# Patient Record
Sex: MALE | Race: WHITE | Employment: OTHER | URBAN - METROPOLITAN AREA
[De-identification: names, ages, dates, MRNs, and addresses within clinical notes are randomized per-mention and may not be internally consistent; named-entity substitution may affect disease eponyms.]

---

## 2017-03-21 RX ORDER — LISINOPRIL AND HYDROCHLOROTHIAZIDE 12.5; 2 MG/1; MG/1
TABLET ORAL
Qty: 45 TAB | Refills: 1 | Status: SHIPPED | COMMUNITY
Start: 2017-03-21 | End: 2017-08-14 | Stop reason: SDUPTHER

## 2017-04-05 ENCOUNTER — OFFICE VISIT (OUTPATIENT)
Dept: INTERNAL MEDICINE CLINIC | Age: 72
End: 2017-04-05

## 2017-04-05 VITALS
BODY MASS INDEX: 30.69 KG/M2 | WEIGHT: 207.2 LBS | RESPIRATION RATE: 20 BRPM | OXYGEN SATURATION: 95 % | HEART RATE: 87 BPM | TEMPERATURE: 98 F | HEIGHT: 69 IN | DIASTOLIC BLOOD PRESSURE: 78 MMHG | SYSTOLIC BLOOD PRESSURE: 130 MMHG

## 2017-04-05 DIAGNOSIS — I25.10 ATHEROSCLEROSIS OF NATIVE CORONARY ARTERY OF NATIVE HEART WITHOUT ANGINA PECTORIS: ICD-10-CM

## 2017-04-05 DIAGNOSIS — Z12.11 SCREEN FOR COLON CANCER: ICD-10-CM

## 2017-04-05 DIAGNOSIS — I10 ESSENTIAL HYPERTENSION, BENIGN: ICD-10-CM

## 2017-04-05 DIAGNOSIS — E78.2 MIXED HYPERLIPIDEMIA: Primary | ICD-10-CM

## 2017-04-05 DIAGNOSIS — C61 MALIGNANT NEOPLASM OF PROSTATE (HCC): ICD-10-CM

## 2017-04-05 NOTE — PROGRESS NOTES
HISTORY OF PRESENT ILLNESS  Esther Jackson is a 66 y.o. male. HPI Olivia is seen today for follow up of hyperlipidemia and other problems. 1. Mixed hyperlipidemia, essential hypertension. Reviewed labs. Numbers are stable. 2. Prostate cancer. Up to date with specialist follow up. Treatment is watchful waiting. 3. Preventive medicine. Physical in six months. He declines colonoscopy due to fear of infection. See My Chart notes. A close friend has had a severe complication and this has made him really nervous about getting the test.  I have encouraged him to schedule a consultation with a gastroenterologist to discuss. Medications reviewed. He is on a supplement that appears benign on my review of its ingredients although I told Olivia there were some things in the preparation with which I was not familiar. MedDATA/gwo     Current Outpatient Prescriptions   Medication Sig    lisinopril-hydroCHLOROthiazide (PRINZIDE, ZESTORETIC) 20-12.5 mg per tablet TAKE 1/2 TABLET EVERY MORNING    atorvastatin (LIPITOR) 40 mg tablet TAKE 1 TABLET NIGHTLY    KRILL OIL PO Take  by mouth.  melatonin 3 mg tablet Take  by mouth.  VIAGRA 100 mg tablet take 1 tablet by mouth if needed    cholecalciferol, vitamin D3, 2,000 unit tab Take  by mouth.  amLODIPine (NORVASC) 5 mg tablet Take 5 mg by mouth daily.  fluticasone (FLONASE) 50 mcg/actuation nasal spray 2 sprays by Both Nostrils route daily.  UBIDECARENONE/VITAMIN E MIXED (COQ10  PO) Take  by mouth.  omega-3 fatty acids-vitamin e (FISH OIL) 1,000 mg Cap Take 1 Cap by mouth every morning.  aspirin 81 mg Tab Take 1 Tab by mouth every morning. No current facility-administered medications for this visit. Review of Systems   Constitutional: Negative for weight loss. Respiratory: Negative. Cardiovascular: Negative for chest pain, palpitations, leg swelling and PND. Gastrointestinal: Negative for blood in stool and melena. Musculoskeletal: Negative for myalgias. Neurological: Negative for focal weakness. Physical Exam   Constitutional: No distress. Neck: Carotid bruit is not present. Cardiovascular: Normal rate and regular rhythm. Exam reveals no gallop and no friction rub. No murmur heard. Pulmonary/Chest: Effort normal and breath sounds normal. No respiratory distress. Musculoskeletal: He exhibits edema. Trace stasis edema bilateral lower extremities. Nursing note and vitals reviewed. ASSESSMENT and PLAN  Manuel Eckert was seen today for follow-up.     Diagnoses and all orders for this visit:    Mixed hyperlipidemia  -     LIPID PANEL  -     METABOLIC PANEL, COMPREHENSIVE  -     CBC WITH AUTOMATED DIFF  -     TSH 3RD GENERATION    Malignant neoplasm of prostate [facility])- See urologist as directed     Atherosclerosis of native coronary artery of native heart without angina pectoris- Continue current regimen of prescription and / or OTC medications     Essential hypertension, benign  -     URINALYSIS W/MICROSCOPIC    Screen for colon cancer  -     REFERRAL TO GASTROENTEROLOGY

## 2017-06-01 RX ORDER — ATORVASTATIN CALCIUM 40 MG/1
TABLET, FILM COATED ORAL
Qty: 90 TAB | Refills: 3 | Status: SHIPPED | COMMUNITY
Start: 2017-06-01 | End: 2017-10-05 | Stop reason: SINTOL

## 2017-06-27 RX ORDER — SILDENAFIL CITRATE 100 MG/1
TABLET, FILM COATED ORAL
Qty: 4 TAB | Refills: 98 | Status: SHIPPED | OUTPATIENT
Start: 2017-06-27 | End: 2018-05-30 | Stop reason: SDUPTHER

## 2017-06-27 NOTE — TELEPHONE ENCOUNTER
Last Appointment with Dr. Lelan Gottron:  4/5/2017  Future Appointments  Date Time Provider Heidy Kyle   10/5/2017 2:00 PM Analy Hansen MD [facility] SCHED     Orders Placed This Encounter    VIAGRA 100 mg tablet     Sig: take 1 tablet by mouth daily if needed     Dispense:  4 Tab     Refill:  98     The above medication refills were approved via verbal order by Dr. Lelan Gottron.

## 2017-08-15 RX ORDER — LISINOPRIL AND HYDROCHLOROTHIAZIDE 12.5; 2 MG/1; MG/1
TABLET ORAL
Qty: 45 TAB | Refills: 3 | Status: SHIPPED | COMMUNITY
Start: 2017-08-15 | End: 2018-10-09 | Stop reason: SDUPTHER

## 2017-09-12 ENCOUNTER — TELEPHONE (OUTPATIENT)
Dept: INTERNAL MEDICINE CLINIC | Age: 72
End: 2017-09-12

## 2017-09-12 NOTE — TELEPHONE ENCOUNTER
Spoke with pt - states has been off his cholesterol med for a month - has noticed leg pain he was experiencing is gone. Wants to know what he needs to do next? Noted in chart pt is scheduled for his fuv on 10/5/17.  Will forward to MD.

## 2017-09-26 ENCOUNTER — HOSPITAL ENCOUNTER (OUTPATIENT)
Dept: LAB | Age: 72
Discharge: HOME | End: 2017-09-26
Payer: MEDICARE

## 2017-09-26 PROCEDURE — 36415 COLL VENOUS BLD VENIPUNCTURE: CPT

## 2017-09-26 PROCEDURE — 85025 COMPLETE CBC W/AUTO DIFF WBC: CPT

## 2017-09-26 PROCEDURE — 84443 ASSAY THYROID STIM HORMONE: CPT

## 2017-09-26 PROCEDURE — 80061 LIPID PANEL: CPT

## 2017-09-26 PROCEDURE — 81001 URINALYSIS AUTO W/SCOPE: CPT

## 2017-09-26 PROCEDURE — 80053 COMPREHEN METABOLIC PANEL: CPT

## 2017-09-27 LAB
ALBUMIN SERPL-MCNC: 4.4 G/DL (ref 3.5–4.8)
ALBUMIN/GLOB SERPL: 1.6 {RATIO} (ref 1.2–2.2)
ALP SERPL-CCNC: 64 IU/L (ref 39–117)
ALT SERPL-CCNC: 21 IU/L (ref 0–44)
APPEARANCE UR: CLEAR
AST SERPL-CCNC: 24 IU/L (ref 0–40)
BACTERIA #/AREA URNS HPF: NORMAL /[HPF]
BASOPHILS # BLD AUTO: 0.1 X10E3/UL (ref 0–0.2)
BASOPHILS NFR BLD AUTO: 1 %
BILIRUB SERPL-MCNC: 0.5 MG/DL (ref 0–1.2)
BILIRUB UR QL STRIP: NEGATIVE
BUN SERPL-MCNC: 18 MG/DL (ref 8–27)
BUN/CREAT SERPL: 16 (ref 10–24)
CALCIUM SERPL-MCNC: 9.6 MG/DL (ref 8.6–10.2)
CASTS URNS QL MICRO: NORMAL /LPF
CHLORIDE SERPL-SCNC: 102 MMOL/L (ref 96–106)
CHOLEST SERPL-MCNC: 229 MG/DL (ref 100–199)
CO2 SERPL-SCNC: 25 MMOL/L (ref 18–29)
COLOR UR: YELLOW
CREAT SERPL-MCNC: 1.12 MG/DL (ref 0.76–1.27)
EOSINOPHIL # BLD AUTO: 0.1 X10E3/UL (ref 0–0.4)
EOSINOPHIL NFR BLD AUTO: 2 %
EPI CELLS #/AREA URNS HPF: NORMAL /HPF
ERYTHROCYTE [DISTWIDTH] IN BLOOD BY AUTOMATED COUNT: 14.1 % (ref 12.3–15.4)
GLOBULIN SER CALC-MCNC: 2.7 G/DL (ref 1.5–4.5)
GLUCOSE SERPL-MCNC: 84 MG/DL (ref 65–99)
GLUCOSE UR QL: NEGATIVE
HCT VFR BLD AUTO: 47.6 % (ref 37.5–51)
HDLC SERPL-MCNC: 36 MG/DL
HGB BLD-MCNC: 16.4 G/DL (ref 12.6–17.7)
HGB UR QL STRIP: NEGATIVE
IMM GRANULOCYTES # BLD: 0 X10E3/UL (ref 0–0.1)
IMM GRANULOCYTES NFR BLD: 0 %
KETONES UR QL STRIP: NEGATIVE
LDLC SERPL CALC-MCNC: 138 MG/DL (ref 0–99)
LEUKOCYTE ESTERASE UR QL STRIP: NEGATIVE
LYMPHOCYTES # BLD AUTO: 2.3 X10E3/UL (ref 0.7–3.1)
LYMPHOCYTES NFR BLD AUTO: 36 %
MCH RBC QN AUTO: 29.7 PG (ref 26.6–33)
MCHC RBC AUTO-ENTMCNC: 34.5 G/DL (ref 31.5–35.7)
MCV RBC AUTO: 86 FL (ref 79–97)
MICRO URNS: NORMAL
MICRO URNS: NORMAL
MONOCYTES # BLD AUTO: 0.6 X10E3/UL (ref 0.1–0.9)
MONOCYTES NFR BLD AUTO: 10 %
MUCOUS THREADS URNS QL MICRO: PRESENT
NEUTROPHILS # BLD AUTO: 3.3 X10E3/UL (ref 1.4–7)
NEUTROPHILS NFR BLD AUTO: 51 %
NITRITE UR QL STRIP: NEGATIVE
PH UR STRIP: 6.5 [PH] (ref 5–7.5)
PLATELET # BLD AUTO: 250 X10E3/UL (ref 150–379)
POTASSIUM SERPL-SCNC: 4.6 MMOL/L (ref 3.5–5.2)
PROT SERPL-MCNC: 7.1 G/DL (ref 6–8.5)
PROT UR QL STRIP: NEGATIVE
RBC # BLD AUTO: 5.53 X10E6/UL (ref 4.14–5.8)
RBC #/AREA URNS HPF: NORMAL /HPF
SODIUM SERPL-SCNC: 141 MMOL/L (ref 134–144)
SP GR UR: 1.03 (ref 1–1.03)
TRIGL SERPL-MCNC: 274 MG/DL (ref 0–149)
TSH SERPL DL<=0.005 MIU/L-ACNC: 2.05 UIU/ML (ref 0.45–4.5)
UROBILINOGEN UR STRIP-MCNC: 0.2 MG/DL (ref 0.2–1)
VLDLC SERPL CALC-MCNC: 55 MG/DL (ref 5–40)
WBC # BLD AUTO: 6.4 X10E3/UL (ref 3.4–10.8)
WBC #/AREA URNS HPF: NORMAL /HPF

## 2017-10-05 ENCOUNTER — OFFICE VISIT (OUTPATIENT)
Dept: INTERNAL MEDICINE CLINIC | Age: 72
End: 2017-10-05

## 2017-10-05 VITALS
HEIGHT: 69 IN | SYSTOLIC BLOOD PRESSURE: 120 MMHG | OXYGEN SATURATION: 96 % | BODY MASS INDEX: 30.66 KG/M2 | WEIGHT: 207 LBS | HEART RATE: 91 BPM | TEMPERATURE: 98.5 F | DIASTOLIC BLOOD PRESSURE: 70 MMHG | RESPIRATION RATE: 18 BRPM

## 2017-10-05 DIAGNOSIS — E78.2 MIXED HYPERLIPIDEMIA: ICD-10-CM

## 2017-10-05 DIAGNOSIS — C61 MALIGNANT NEOPLASM OF PROSTATE (HCC): ICD-10-CM

## 2017-10-05 DIAGNOSIS — Z13.39 SCREENING FOR ALCOHOLISM: ICD-10-CM

## 2017-10-05 DIAGNOSIS — I10 ESSENTIAL HYPERTENSION, BENIGN: ICD-10-CM

## 2017-10-05 DIAGNOSIS — Z00.00 MEDICARE ANNUAL WELLNESS VISIT, SUBSEQUENT: Primary | ICD-10-CM

## 2017-10-05 DIAGNOSIS — Z71.89 ADVANCED CARE PLANNING/COUNSELING DISCUSSION: ICD-10-CM

## 2017-10-05 DIAGNOSIS — I25.10 ATHEROSCLEROSIS OF NATIVE CORONARY ARTERY OF NATIVE HEART WITHOUT ANGINA PECTORIS: ICD-10-CM

## 2017-10-05 DIAGNOSIS — Z13.31 SCREENING FOR DEPRESSION: ICD-10-CM

## 2017-10-05 RX ORDER — ROSUVASTATIN CALCIUM 10 MG/1
10 TABLET, COATED ORAL DAILY
Qty: 90 TAB | Refills: 3 | Status: SHIPPED | COMMUNITY
Start: 2017-10-05 | End: 2018-07-26 | Stop reason: SDUPTHER

## 2017-10-05 NOTE — PATIENT INSTRUCTIONS
Today you had a Medicare Wellness Visit. During this visit, we developed and/or updated your personalized health plan to prevent disease and disability based on your current health and risk factors. Please schedule an appt around this time next year so we can continue to keep you on the right path to living a healthy lifestyle. Schedule of Personalized Health Plan    The best way to stay healthy is to live a healthy lifestyle. A healthy lifestyle includes regular exercise, eating a well-balanced diet, keeping a healthy weight and not smoking. Regular physical exams and screening tests are another important way to take care of yourself. Preventive exams provided by health care providers can find health problems early when treatment works best and can keep you from getting certain diseases or illnesses. Preventive services include exams, lab tests, screenings, shots, monitoring and information to help you take care of your own health. All people over 65 should have a pneumonia shot. Pneumonia shots are usually only needed once in a lifetime unless your doctor decides differently. All people over 65 should have a yearly flu shot. People over 65 are at medium to high risk for Hepatitis B. Three shots are needed for complete protection. Talk with your provider for more details. In addition to your physical exam, some screening tests are recommended:    Diabetes Mellitus screening is recommended every year. Glaucoma is an eye disease caused by high pressure in the eye. An eye exam is recommended every year. Cardiovascular screening tests that check your cholesterol and other blood fat (lipid) levels are recommended every five years. Colorectal Cancer screening tests help to find pre-cancerous polyps (growths in the colon) so they can be removed before they turn into cancer. Tests ordered for screening depend on your personal and family history risk factors.       Here is a list of your current Health Maintenance items with a due date:  Health Maintenance   Topic Date Due    GLAUCOMA SCREENING Q2Y  05/04/2009    INFLUENZA AGE 9 TO ADULT  08/01/2017    MEDICARE YEARLY EXAM  10/06/2018    COLONOSCOPY  04/11/2023    DTaP/Tdap/Td series (2 - Td) 04/23/2026    ZOSTER VACCINE AGE 60>  Completed    Pneumococcal 65+ High/Highest Risk  Completed

## 2017-10-05 NOTE — PROGRESS NOTES
Isma Corrales is a 67 y.o. male and presents for Annual Medicare Wellness Visit. Assessment of cognitive impairment: Alert and oriented x 3. Abuse Screen:    Abuse Screening Questionnaire 10/5/2017   Do you ever feel afraid of your partner? N   Are you in a relationship with someone who physically or mentally threatens you? N   Is it safe for you to go home? Y       Depression Screen:   PHQ over the last two weeks 10/5/2017   Little interest or pleasure in doing things Not at all   Feeling down, depressed or hopeless Not at all   Total Score PHQ 2 0       Fall Risk Assessment:    Fall Risk Assessment, last 12 mths 10/5/2017   Able to walk? Yes   Fall in past 12 months? No   Fall with injury? -   Number of falls in past 12 months -   Fall Risk Score -       Activities of Daily Living:    ADL Assessment 10/5/2017   Feeding yourself No Help Needed   Getting from bed to chair No Help Needed   Getting dressed No Help Needed   Bathing or showering No Help Needed   Walk across the room (includes cane/walker) No Help Needed   Using the telphone No Help Needed   Taking your medications No Help Needed   Preparing meals No Help Needed   Managing money (expenses/bills) No Help Needed   Moderately strenuous housework (laundry) No Help Needed   Shopping for personal items (toiletries/medicines) No Help Needed   Shopping for groceries No Help Needed   Driving No Help Needed   Climbing a flight of stairs No Help Needed   Getting to places beyond walking distances No Help Needed       Health Maintenance:  Daily Low Dose Aspirin: yes  Bone Density: NA  Glaucoma Screening: yes, 3/21/17  Immunizations:    Tetanus: up to date 4/23/16. Influenza: will obtain in November 2017. Shingles:  up to date 4/9/12. Pneumovax:  up to date 10/5/09. Prevnar: up to date 10/15/15. Cancer screening:    Cervical: NA.  Breast: NA.  Colon: patient states he is due in 2018.   Prostate:  managed by Urology    Advance Care Planning:   End of Life Planning: has an advanced directive - a copy HAS NOT been provided. Provided pt with \"Respecting Choices packet of Information\" no  Offered facilitator session with NN no     Medications/Allergies: Reviewed with patient  Prior to Admission medications    Medication Sig Start Date End Date Taking? Authorizing Provider   lisinopril-hydroCHLOROthiazide (PRINZIDE, ZESTORETIC) 20-12.5 mg per tablet TAKE 1/2 TABLET EVERY MORNING 8/15/17  Yes Beny Macdonald MD   KRILL OIL PO Take  by mouth. Yes Historical Provider   melatonin 3 mg tablet Take  by mouth. Yes Historical Provider   cholecalciferol, vitamin D3, 2,000 unit tab Take  by mouth. Yes Historical Provider   amLODIPine (NORVASC) 5 mg tablet Take 5 mg by mouth daily. 9/14/15  Yes Historical Provider   UBIDECARENONE/VITAMIN E MIXED (COQ10  PO) Take  by mouth. Yes Historical Provider   omega-3 fatty acids-vitamin e (FISH OIL) 1,000 mg Cap Take 1 Cap by mouth every morning. Yes Historical Provider   aspirin 81 mg Tab Take 1 Tab by mouth every morning. Yes Historical Provider   VIAGRA 100 mg tablet take 1 tablet by mouth daily if needed 6/27/17   Beny Macdonald MD   atorvastatin (LIPITOR) 40 mg tablet TAKE 1 TABLET NIGHTLY 6/1/17   Beny Macdonald MD   fluticasone (FLONASE) 50 mcg/actuation nasal spray 2 sprays by Both Nostrils route daily. 10/9/14   Beny Macdonald MD     Allergies   Allergen Reactions   Jimbo Hock Pcn [Penicillins] Rash       PSH: Reviewed with patient  Past Surgical History:   Procedure Laterality Date    ENDOSCOPY, COLON, DIAGNOSTIC      08, 14 due 18- see my chart message 5/12/17.         SH: Reviewed with patient  Social History   Substance Use Topics    Smoking status: Never Smoker    Smokeless tobacco: Never Used    Alcohol use 0.0 oz/week     0 Standard drinks or equivalent per week      Comment: weekend drinks social       FH: Reviewed with patient  Family History   Problem Relation Age of Onset    Heart Disease Father     Diabetes Father     Heart Disease Sister     Depression Brother          Objective:  Visit Vitals    /81 (BP 1 Location: Right arm, BP Patient Position: Sitting)    Pulse 91    Temp 98.5 °F (36.9 °C) (Oral)    Resp 18    Ht 5' 9\" (1.753 m)    Wt 207 lb (93.9 kg)    SpO2 96%    BMI 30.57 kg/m2    Body mass index is 30.57 kg/(m^2). Alcohol Risk Screen:   On any occasion during past 3 months, have you had more than 3 drinks (female) or 4 drinks (male) containing alcohol? No  Do you average more than 7 drinks (female) or 14 drinks (male) per week? No  Type and Amount: socially on occasion    Tobacco Abuse:  No    Nutrition Screen:  eats a balanced diet    Hearing Loss:  mild    Vision Loss:   Wears glasses, contact lenses, or have any other visual impairment  Yes, glasses    Activities of Daily Living:  Self-care. Requires assistance with: no ADLs  Patient handle his/her own medications  yes     Current medical providers:    Patient Care Team:  Allyssa Barreto MD as PCP - General      Plan:      No orders of the defined types were placed in this encounter. Health Maintenance   Topic Date Due    INFLUENZA AGE 9 TO ADULT  08/01/2017    COLONOSCOPY  04/11/2018    MEDICARE YEARLY EXAM  10/06/2018    GLAUCOMA SCREENING Q2Y  03/02/2019    DTaP/Tdap/Td series (2 - Td) 04/23/2026    ZOSTER VACCINE AGE 60>  Completed    Pneumococcal 65+ High/Highest Risk  Completed       *Patient verbalized understanding and agreement with the plan. A copy of the After Visit Summary with personalized health plan was given to the patient today. Physical Exam will be performed by PCP and documented under a separate Progress Note.

## 2017-10-05 NOTE — PROGRESS NOTES
HISTORY OF PRESENT ILLNESS  Derrek Mitchell is a 67 y.o. male. DB Baker is seen today for a complete physical examination, Medicare Wellness Visit and follow up of chronic problems. Preventive medicine. See attached nurse navigator note. This was fully reviewed and discussed with the navigator. He is due for bringing in a copy of his Advanced Medical Directive and the flu vaccine. He will get this later on in the season. He has declined a colonoscopy. Chronic medical problems are reviewed. 1. He has significant elevation of cholesterol. He had myalgias with 40 mg of Lipitor. Reviewed with him his diagnosis of CAD based on calcium screening, indicated treatment is required to lessen a chance of heart attack or stroke. He agrees. We will try Crestor at a lower dose and make sure he tolerates it and then adjust as needed. He denies chest pain, shortness of breath, or other cardiac symptom. 2. Prostate cancer. Up to date with urology assessment. 3. Hypertension. Excellent home readings, average of 120/70. MedDATA/gwo     Current Outpatient Prescriptions   Medication Sig    rosuvastatin (CRESTOR) 10 mg tablet Take 1 Tab by mouth daily.  lisinopril-hydroCHLOROthiazide (PRINZIDE, ZESTORETIC) 20-12.5 mg per tablet TAKE 1/2 TABLET EVERY MORNING    KRILL OIL PO Take  by mouth.  melatonin 3 mg tablet Take  by mouth.  cholecalciferol, vitamin D3, 2,000 unit tab Take  by mouth.  amLODIPine (NORVASC) 5 mg tablet Take 5 mg by mouth daily.  UBIDECARENONE/VITAMIN E MIXED (COQ10  PO) Take  by mouth.  omega-3 fatty acids-vitamin e (FISH OIL) 1,000 mg Cap Take 1 Cap by mouth every morning.  aspirin 81 mg Tab Take 1 Tab by mouth every morning.  VIAGRA 100 mg tablet take 1 tablet by mouth daily if needed    fluticasone (FLONASE) 50 mcg/actuation nasal spray 2 sprays by Both Nostrils route daily. No current facility-administered medications for this visit.           Review of Systems   Constitutional: Negative for weight loss. Respiratory: Negative. Cardiovascular: Negative for chest pain, palpitations, leg swelling and PND. Gastrointestinal: Negative. Genitourinary: Positive for frequency. Musculoskeletal: Negative for myalgias. Neurological: Negative for focal weakness. Physical Exam   Constitutional: He is oriented to person, place, and time. He appears well-developed and well-nourished. No distress. HENT:   Head: Normocephalic and atraumatic. Right Ear: Tympanic membrane, external ear and ear canal normal.   Left Ear: Tympanic membrane, external ear and ear canal normal.   Eyes: EOM are normal. Pupils are equal, round, and reactive to light. Right eye exhibits no discharge. Left eye exhibits no discharge. Neck: Normal range of motion. Neck supple. Carotid bruit is not present. No thyromegaly present. Cardiovascular: Normal rate, regular rhythm, normal heart sounds and intact distal pulses. Exam reveals no gallop and no friction rub. No murmur heard. Pulmonary/Chest: Effort normal and breath sounds normal. No respiratory distress. He has no wheezes. He has no rales. Abdominal: Soft. He exhibits no distension and no mass. There is no tenderness. There is no rebound and no guarding. Musculoskeletal: Normal range of motion. He exhibits no edema or tenderness. Lymphadenopathy:     He has no cervical adenopathy. Neurological: He is alert and oriented to person, place, and time. Skin: Skin is warm and dry. No rash noted. Psychiatric: He has a normal mood and affect. His behavior is normal.   Nursing note and vitals reviewed. ASSESSMENT and PLAN  Diagnoses and all orders for this visit:    1. Medicare annual wellness visit, subsequent    2. Advanced care planning/counseling discussion    3. Screening for depression    4. Screening for alcoholism    5. Malignant neoplasm of prostate [facility])- See urologist as directed     6.  Mixed hyperlipidemia  -     rosuvastatin (CRESTOR) 10 mg tablet; Take 1 Tab by mouth daily.  -     COMP METABOLIC PANEL  -     CBC W/ AUTOMATED DIFF  -     LIPID PANEL  -     TSH    7. Essential hypertension, benign- Continue current regimen of prescription and / or OTC medications     8.  Atherosclerosis of native coronary artery of native heart without angina pectoris- Continue current regimen of prescription and / or OTC medications

## 2017-10-05 NOTE — MR AVS SNAPSHOT
Visit Information Date & Time Provider Department Dept. Phone Encounter #  
 10/5/2017  2:00 PM Edgard Julian, [address] [facility] Internal Medicine [phone] [id number] Follow-up Instructions Return in about 6 months (around 4/5/2018). Upcoming Health Maintenance Date Due INFLUENZA AGE 9 TO ADULT 8/1/2017 COLONOSCOPY 4/11/2018 MEDICARE YEARLY EXAM 10/6/2018 GLAUCOMA SCREENING Q2Y 3/2/2019 DTaP/Tdap/Td series (2 - Td) 4/23/2026 Allergies as of 10/5/2017  Review Complete On: 10/5/2017 By: Edgard Julian MD  
  
 Severity Noted Reaction Type Reactions Pcn [Penicillins]  10/02/2009    Rash Current Immunizations  Reviewed on 4/5/2017 Name Date Influenza Vaccine 11/20/2016, 11/8/2014 Pneumococcal Conjugate (PCV-13) 10/15/2015 Tdap 4/23/2016 Varicella Virus Vaccine Live 4/19/2012 ZZZ-RETIRED (DO NOT USE) Pneumococcal Vaccine (Unspecified Type) 10/5/2009 Not reviewed this visit You Were Diagnosed With   
  
 Codes Comments Medicare annual wellness visit, subsequent    -  Primary ICD-10-CM: Z00.00 ICD-9-CM: V70.0 Advanced care planning/counseling discussion     ICD-10-CM: Z71.89 ICD-9-CM: V65.49 Screening for depression     ICD-10-CM: Z13.89 ICD-9-CM: V79.0 Screening for alcoholism     ICD-10-CM: Z13.89 ICD-9-CM: V79.1 Malignant neoplasm of prostate [facility])     ICD-10-CM: E16 ICD-9-CM: 480 Mixed hyperlipidemia     ICD-10-CM: E78.2 ICD-9-CM: 272.2 Essential hypertension, benign     ICD-10-CM: I10 
ICD-9-CM: 401.1 Atherosclerosis of native coronary artery of native heart without angina pectoris     ICD-10-CM: I25.10 ICD-9-CM: 414.01 Vitals BP Pulse Temp Resp Height(growth percentile) Weight(growth percentile) 120/70 91 98.5 °F (36.9 °C) (Oral) 18 5' 9\" (1.753 m) 207 lb (93.9 kg) SpO2 BMI Smoking Status 96% 30.57 kg/m2 Never Smoker Vitals History BMI and BSA Data Body Mass Index Body Surface Area 30.57 kg/m 2 2.14 m 2 Preferred Pharmacy Pharmacy Name Phone Alejandra Webb [address] [place], [place] [address] [facility] [address] [phone] Your Updated Medication List  
  
   
This list is accurate as of: 10/5/17  3:07 PM.  Always use your most recent med list. amLODIPine 5 mg tablet Commonly known as:  Gordon Jonas Take 5 mg by mouth daily. aspirin 81 mg tablet Take 1 Tab by mouth every morning. cholecalciferol (vitamin D3) 2,000 unit Tab Take  by mouth. COQ10  PO Take  by mouth. FISH OIL 1,000 mg Cap Generic drug:  omega-3 fatty acids-vitamin e Take 1 Cap by mouth every morning. fluticasone 50 mcg/actuation nasal spray Commonly known as:  FLONASE  
2 sprays by Both Nostrils route daily. KRILL OIL PO Take  by mouth.  
  
 lisinopril-hydroCHLOROthiazide 20-12.5 mg per tablet Commonly known as:  PRINZIDE, ZESTORETIC  
TAKE 1/2 TABLET EVERY MORNING  
  
 melatonin 3 mg tablet Take  by mouth. rosuvastatin 10 mg tablet Commonly known as:  CRESTOR Take 1 Tab by mouth daily. VIAGRA 100 mg tablet Generic drug:  sildenafil citrate  
take 1 tablet by mouth daily if needed Prescriptions Sent to Mail Order Refills  
 rosuvastatin (CRESTOR) 10 mg tablet 3 Sig: Take 1 Tab by mouth daily. Class: Mail Order Pharmacy: [address] [place] [address] #: [phone] Route: Oral  
  
We Performed the Following CBC WITH AUTOMATED DIFF [33064 CPT(R)] LIPID PANEL [56453 CPT(R)] METABOLIC PANEL, COMPREHENSIVE [09984 CPT(R)] TSH 3RD GENERATION [42519 CPT(R)] Follow-up Instructions Return in about 6 months (around 4/5/2018). Patient Instructions Today you had a Medicare Wellness Visit.   During this visit, we developed and/or updated your personalized health plan to prevent disease and disability based on your current health and risk factors. Please schedule an appt around this time next year so we can continue to keep you on the right path to living a healthy lifestyle. Schedule of Personalized Health Plan The best way to stay healthy is to live a healthy lifestyle. A healthy lifestyle includes regular exercise, eating a well-balanced diet, keeping a healthy weight and not smoking. Regular physical exams and screening tests are another important way to take care of yourself. Preventive exams provided by health care providers can find health problems early when treatment works best and can keep you from getting certain diseases or illnesses. Preventive services include exams, lab tests, screenings, shots, monitoring and information to help you take care of your own health. All people over 65 should have a pneumonia shot. Pneumonia shots are usually only needed once in a lifetime unless your doctor decides differently. All people over 65 should have a yearly flu shot. People over 65 are at medium to high risk for Hepatitis B. Three shots are needed for complete protection. Talk with your provider for more details. In addition to your physical exam, some screening tests are recommended: 
 
Diabetes Mellitus screening is recommended every year. Glaucoma is an eye disease caused by high pressure in the eye. An eye exam is recommended every year. Cardiovascular screening tests that check your cholesterol and other blood fat (lipid) levels are recommended every five years. Colorectal Cancer screening tests help to find pre-cancerous polyps (growths in the colon) so they can be removed before they turn into cancer. Tests ordered for screening depend on your personal and family history risk factors. Here is a list of your current Health Maintenance items with a due date: 
Health Maintenance Topic Date Due  GLAUCOMA SCREENING Q2Y  05/04/2009  INFLUENZA AGE 9 TO ADULT  08/01/2017  MEDICARE YEARLY EXAM  10/06/2018  COLONOSCOPY  04/11/2023  DTaP/Tdap/Td series (2 - Td) 04/23/2026  ZOSTER VACCINE AGE 60>  Completed  Pneumococcal 65+ High/Highest Risk  Completed Introducing [facility] SERVICES! Dear Tequila Bright: Thank you for requesting a [employer] account. Our records indicate that you already have an active [employer] account. You can access your account anytime at [email or web address] Did you know that you can access your hospital and ER discharge instructions at any time in [employer]? You can also review all of your test results from your hospital stay or ER visit. Additional Information If you have questions, please visit the Frequently Asked Questions section of the [employer] website at [email or web address] Remember, [employer] is NOT to be used for urgent needs. For medical emergencies, dial 911. Now available from your iPhone and Android! Please provide this summary of care documentation to your next provider. Your primary care clinician is listed as ANNY RODRIGUEZ. If you have any questions after today's visit, please call [phone].

## 2017-11-15 ENCOUNTER — DOCUMENTATION ONLY (OUTPATIENT)
Dept: INTERNAL MEDICINE CLINIC | Age: 72
End: 2017-11-15

## 2018-01-02 NOTE — MR AVS SNAPSHOT
Left message for patient to return call, wondering if patient could schedule an appointment before running out of medications since it has been almost a year since seeing him.   Visit Information Date & Time Provider Department Dept. Phone Encounter #  
 4/5/2017  9:35 AM Gilberto Nguyễn, [address] [facility] Internal Medicine [phone] [id number] Follow-up Instructions Return in about 6 months (around 10/5/2017) for mwv. Upcoming Health Maintenance Date Due  
 GLAUCOMA SCREENING Q2Y 5/4/2009 Pneumococcal 65+ High/Highest Risk (2 of 2 - PPSV23) 10/5/2014 MEDICARE YEARLY EXAM 10/13/2017 COLONOSCOPY 4/11/2023 DTaP/Tdap/Td series (2 - Td) 4/23/2026 Allergies as of 4/5/2017  Review Complete On: 4/5/2017 By: Gilberto Nguyễn MD  
  
 Severity Noted Reaction Type Reactions Pcn [Penicillins]  10/02/2009    Rash Current Immunizations  Reviewed on 4/5/2017 Name Date Influenza Vaccine 11/20/2016, 11/8/2014 Pneumococcal Conjugate (PCV-13) 10/15/2015 Pneumococcal Vaccine (Unspecified Type) 10/5/2009 Varicella Virus Vaccine Live 4/19/2012 Reviewed by Aviva Denson on 4/5/2017 at  9:46 AM  
 Reviewed by Aviva Denson on 4/5/2017 at  9:46 AM  
You Were Diagnosed With   
  
 Codes Comments Mixed hyperlipidemia    -  Primary ICD-10-CM: J80.3 ICD-9-CM: 272.2 Malignant neoplasm of prostate [facility])     ICD-10-CM: K43 ICD-9-CM: 193 Atherosclerosis of native coronary artery of native heart without angina pectoris     ICD-10-CM: I25.10 ICD-9-CM: 414.01 Essential hypertension, benign     ICD-10-CM: I10 
ICD-9-CM: 401.1 Screen for colon cancer     ICD-10-CM: Z12.11 ICD-9-CM: V76.51 Vitals BP Pulse Temp Resp Height(growth percentile) Weight(growth percentile) 130/78 87 98 °F (36.7 °C) (Oral) 20 5' 9\" (1.753 m) 207 lb 3.2 oz (94 kg) SpO2 BMI Smoking Status 95% 30.6 kg/m2 Never Smoker Vitals History BMI and BSA Data Body Mass Index Body Surface Area 30.6 kg/m 2 2.14 m 2 Preferred Pharmacy Pharmacy Name Phone Alejandra Webb [address] [place], [place] [address] [facility] [address] [phone] Your Updated Medication List  
  
   
This list is accurate as of: 4/5/17 10:43 AM.  Always use your most recent med list. amLODIPine 5 mg tablet Commonly known as:  Rosio Punter Take 5 mg by mouth daily. aspirin 81 mg tablet Take 1 Tab by mouth every morning. atorvastatin 40 mg tablet Commonly known as:  LIPITOR  
TAKE 1 TABLET NIGHTLY  
  
 cholecalciferol (vitamin D3) 2,000 unit Tab Take  by mouth. COQ10  PO Take  by mouth. FISH OIL 1,000 mg Cap Generic drug:  omega-3 fatty acids-vitamin e Take 1 Cap by mouth every morning. fluticasone 50 mcg/actuation nasal spray Commonly known as:  FLONASE  
2 sprays by Both Nostrils route daily. KRILL OIL PO Take  by mouth.  
  
 lisinopril-hydroCHLOROthiazide 20-12.5 mg per tablet Commonly known as:  PRINZIDE, ZESTORETIC  
TAKE 1/2 TABLET EVERY MORNING  
  
 melatonin 3 mg tablet Take  by mouth. VIAGRA 100 mg tablet Generic drug:  sildenafil citrate  
take 1 tablet by mouth if needed We Performed the Following CBC WITH AUTOMATED DIFF [90887 CPT(R)] LIPID PANEL [80954 CPT(R)] METABOLIC PANEL, COMPREHENSIVE [14810 CPT(R)] REFERRAL TO GASTROENTEROLOGY [ZUR84 Custom] TSH 3RD GENERATION [09917 CPT(R)] URINALYSIS W/MICROSCOPIC [43790 CPT(R)] Follow-up Instructions Return in about 6 months (around 10/5/2017) for mwv. Referral Information Referral ID Referred By Referred To  
  
 [id number] JENNIFER, [address] MD Steven   
   [address] [facility] 030 66 62 83 [place], [address] Phone: [phone] Fax: [phone] Visits Status Start Date End Date 1 New Request 4/5/17 4/5/18 If your referral has a status of pending review or denied, additional information will be sent to support the outcome of this decision. Introducing [facility] SERVICES! Dear Patrick Conley: Thank you for requesting a Yottaa account. Our records indicate that you already have an active Yottaa account. You can access your account anytime at [email or web address] Did you know that you can access your hospital and ER discharge instructions at any time in Yottaa? You can also review all of your test results from your hospital stay or ER visit. Additional Information If you have questions, please visit the Frequently Asked Questions section of the Yottaa website at [email or web address] Remember, Yottaa is NOT to be used for urgent needs. For medical emergencies, dial 911. Now available from your iPhone and Android! Please provide this summary of care documentation to your next provider. Your primary care clinician is listed as ANNY RODRIGUEZ. If you have any questions after today's visit, please call [phone].

## 2018-04-06 ENCOUNTER — HOSPITAL ENCOUNTER (OUTPATIENT)
Dept: LAB | Age: 73
Discharge: HOME | End: 2018-04-06
Payer: MEDICARE

## 2018-04-06 PROCEDURE — 80061 LIPID PANEL: CPT

## 2018-04-06 PROCEDURE — 85025 COMPLETE CBC W/AUTO DIFF WBC: CPT

## 2018-04-06 PROCEDURE — 84443 ASSAY THYROID STIM HORMONE: CPT

## 2018-04-06 PROCEDURE — 36415 COLL VENOUS BLD VENIPUNCTURE: CPT

## 2018-04-06 PROCEDURE — 80053 COMPREHEN METABOLIC PANEL: CPT

## 2018-04-09 LAB
ALBUMIN SERPL-MCNC: 4.4 G/DL (ref 3.5–4.8)
ALBUMIN/GLOB SERPL: 1.6 {RATIO} (ref 1.2–2.2)
ALP SERPL-CCNC: 62 IU/L (ref 39–117)
ALT SERPL-CCNC: 16 IU/L (ref 0–44)
AST SERPL-CCNC: 23 IU/L (ref 0–40)
BASOPHILS # BLD AUTO: 0.1 X10E3/UL (ref 0–0.2)
BASOPHILS NFR BLD AUTO: 1 %
BILIRUB SERPL-MCNC: 0.6 MG/DL (ref 0–1.2)
BUN SERPL-MCNC: 16 MG/DL (ref 8–27)
BUN/CREAT SERPL: 15 (ref 10–24)
CALCIUM SERPL-MCNC: 9.8 MG/DL (ref 8.6–10.2)
CHLORIDE SERPL-SCNC: 101 MMOL/L (ref 96–106)
CHOLEST SERPL-MCNC: 165 MG/DL (ref 100–199)
CO2 SERPL-SCNC: 21 MMOL/L (ref 18–29)
CREAT SERPL-MCNC: 1.07 MG/DL (ref 0.76–1.27)
EOSINOPHIL # BLD AUTO: 0.1 X10E3/UL (ref 0–0.4)
EOSINOPHIL NFR BLD AUTO: 2 %
ERYTHROCYTE [DISTWIDTH] IN BLOOD BY AUTOMATED COUNT: 14.5 % (ref 12.3–15.4)
GFR SERPLBLD CREATININE-BSD FMLA CKD-EPI: 68 ML/MIN/1.73
GFR SERPLBLD CREATININE-BSD FMLA CKD-EPI: 79 ML/MIN/1.73
GLOBULIN SER CALC-MCNC: 2.7 G/DL (ref 1.5–4.5)
GLUCOSE SERPL-MCNC: 89 MG/DL (ref 65–99)
HCT VFR BLD AUTO: 46 % (ref 37.5–51)
HDLC SERPL-MCNC: 40 MG/DL
HGB BLD-MCNC: 16.1 G/DL (ref 13–17.7)
IMM GRANULOCYTES # BLD: 0 X10E3/UL (ref 0–0.1)
IMM GRANULOCYTES NFR BLD: 0 %
LDLC SERPL CALC-MCNC: 87 MG/DL (ref 0–99)
LYMPHOCYTES # BLD AUTO: 2.1 X10E3/UL (ref 0.7–3.1)
LYMPHOCYTES NFR BLD AUTO: 31 %
MCH RBC QN AUTO: 30.1 PG (ref 26.6–33)
MCHC RBC AUTO-ENTMCNC: 35 G/DL (ref 31.5–35.7)
MCV RBC AUTO: 86 FL (ref 79–97)
MONOCYTES # BLD AUTO: 0.6 X10E3/UL (ref 0.1–0.9)
MONOCYTES NFR BLD AUTO: 8 %
NEUTROPHILS # BLD AUTO: 4.1 X10E3/UL (ref 1.4–7)
NEUTROPHILS NFR BLD AUTO: 58 %
PLATELET # BLD AUTO: 250 X10E3/UL (ref 150–379)
POTASSIUM SERPL-SCNC: 4 MMOL/L (ref 3.5–5.2)
PROT SERPL-MCNC: 7.1 G/DL (ref 6–8.5)
RBC # BLD AUTO: 5.35 X10E6/UL (ref 4.14–5.8)
SODIUM SERPL-SCNC: 140 MMOL/L (ref 134–144)
TRIGL SERPL-MCNC: 191 MG/DL (ref 0–149)
TSH SERPL DL<=0.005 MIU/L-ACNC: 1.96 UIU/ML (ref 0.45–4.5)
VLDLC SERPL CALC-MCNC: 38 MG/DL (ref 5–40)
WBC # BLD AUTO: 6.9 X10E3/UL (ref 3.4–10.8)

## 2018-04-10 ENCOUNTER — OFFICE VISIT (OUTPATIENT)
Dept: INTERNAL MEDICINE CLINIC | Age: 73
End: 2018-04-10

## 2018-04-10 VITALS
BODY MASS INDEX: 30.66 KG/M2 | WEIGHT: 207 LBS | SYSTOLIC BLOOD PRESSURE: 126 MMHG | RESPIRATION RATE: 18 BRPM | TEMPERATURE: 97.7 F | OXYGEN SATURATION: 95 % | HEIGHT: 69 IN | DIASTOLIC BLOOD PRESSURE: 77 MMHG | HEART RATE: 67 BPM

## 2018-04-10 DIAGNOSIS — I10 ESSENTIAL HYPERTENSION, BENIGN: ICD-10-CM

## 2018-04-10 DIAGNOSIS — E78.2 MIXED HYPERLIPIDEMIA: Primary | ICD-10-CM

## 2018-04-10 DIAGNOSIS — I25.10 ATHEROSCLEROSIS OF NATIVE CORONARY ARTERY OF NATIVE HEART WITHOUT ANGINA PECTORIS: ICD-10-CM

## 2018-04-10 DIAGNOSIS — C61 MALIGNANT NEOPLASM OF PROSTATE (HCC): ICD-10-CM

## 2018-04-10 RX ORDER — TEMAZEPAM 15 MG/1
CAPSULE ORAL
COMMUNITY
End: 2019-12-26 | Stop reason: SDUPTHER

## 2018-04-10 RX ORDER — CETIRIZINE HCL 10 MG
TABLET ORAL
COMMUNITY

## 2018-04-10 NOTE — MR AVS SNAPSHOT
[address] [address] 
[phone] Patient: Sana Sepulveda MRN: [id number] :[birth year] Visit Information Date & Time Provider Department Dept. Phone Encounter #  
 4/10/2018  1:20 PM Lynne Arriaza, 1989 [facility] Internal Medicine [phone] Follow-up Instructions Return in about 6 months (around 10/10/2018) for mwv. Upcoming Health Maintenance Date Due COLONOSCOPY 4/11/2018 MEDICARE YEARLY EXAM 10/6/2018 GLAUCOMA SCREENING Q2Y 3/2/2019 DTaP/Tdap/Td series (2 - Td) 4/23/2026 Allergies as of 4/10/2018  Review Complete On: 4/10/2018 By: Lynne Arriaza MD  
  
 Severity Noted Reaction Type Reactions Pcn [Penicillins]  10/02/2009    Rash Current Immunizations  Reviewed on 4/5/2017 Name Date Influenza Vaccine 11/20/2016, 11/8/2014 Pneumococcal Conjugate (PCV-13) 10/15/2015 Tdap 4/23/2016 Varicella Virus Vaccine Live 4/19/2012 ZZZ-RETIRED (DO NOT USE) Pneumococcal Vaccine (Unspecified Type) 10/5/2009 Not reviewed this visit You Were Diagnosed With   
  
 Codes Comments Mixed hyperlipidemia    -  Primary ICD-10-CM: O28.0 ICD-9-CM: 272.2 Malignant neoplasm of prostate [facility])     ICD-10-CM: H08 ICD-9-CM: 476 Essential hypertension, benign     ICD-10-CM: I10 
ICD-9-CM: 401.1 Atherosclerosis of native coronary artery of native heart without angina pectoris     ICD-10-CM: I25.10 ICD-9-CM: 414.01 Vitals BP Pulse Temp Resp Height(growth percentile) Weight(growth percentile) 126/77 (BP 1 Location: Left arm, BP Patient Position: Sitting) 67 97.7 °F (36.5 °C) (Oral) 18 5' 9\" (1.753 m) 207 lb (93.9 kg) SpO2 BMI Smoking Status 95% 30.57 kg/m2 Never Smoker BMI and BSA Data Body Mass Index Body Surface Area 30.57 kg/m 2 2.14 m 2 Preferred Pharmacy Pharmacy Name Phone [place] [address] [phone] Your Updated Medication List  
  
   
This list is accurate as of 4/10/18  1:50 PM.  Always use your most recent med list. amLODIPine 5 mg tablet Commonly known as:  Leena Pall Take 5 mg by mouth daily. aspirin 81 mg tablet Take 1 Tab by mouth every morning. cholecalciferol (vitamin D3) 2,000 unit Tab Take  by mouth. COQ10  PO Take  by mouth. fluticasone 50 mcg/actuation nasal spray Commonly known as:  FLONASE  
2 sprays by Both Nostrils route daily. KRILL OIL PO Take  by mouth.  
  
 lisinopril-hydroCHLOROthiazide 20-12.5 mg per tablet Commonly known as:  PRINZIDE, ZESTORETIC  
TAKE 1/2 TABLET EVERY MORNING  
  
 melatonin 3 mg tablet Take  by mouth. rosuvastatin 10 mg tablet Commonly known as:  CRESTOR Take 1 Tab by mouth daily. temazepam 15 mg capsule Commonly known as:  RESTORIL Take  by mouth nightly as needed for Sleep. VIAGRA 100 mg tablet Generic drug:  sildenafil citrate  
take 1 tablet by mouth daily if needed ZyrTEC 10 mg tablet Generic drug:  cetirizine Take  by mouth daily as needed for Allergies. We Performed the Following LIPID PANEL [04753 CPT(R)] METABOLIC PANEL, COMPREHENSIVE [54757 CPT(R)] Follow-up Instructions Return in about 6 months (around 10/10/2018) for mwv. Introducing [facility] & HEALTH SERVICES! Dear Luigi Reina: Thank you for requesting a moziy account. Our records indicate that you already have an active moziy account. You can access your account anytime at [email or web address] Did you know that you can access your hospital and ER discharge instructions at any time in moziy? You can also review all of your test results from your hospital stay or ER visit. Additional Information If you have questions, please visit the Frequently Asked Questions section of the PathCentralhart website at [email or web address] Remember, [employer] is NOT to be used for urgent needs. For medical emergencies, dial 911. Now available from your iPhone and Android! Please provide this summary of care documentation to your next provider. Your primary care clinician is listed as ANNY RODRIGUEZ. If you have any questions after today's visit, please call [phone].

## 2018-04-10 NOTE — PROGRESS NOTES
HISTORY OF PRESENT ILLNESS  Julien Ashford is a 67 y.o. male. HPI Olivia is seen today for follow up of hypertension and other problems. 1.  Hypertension. Stable on current regimen. 2.  Hyperlipidemia, reviewed labs. Results are fine. No side effects reported. 3.  CAD per positive calcium score. He has not had symptoms. He is up to date with cardiology follow up. 4.  Prostate cancer. Followed with surveillance. He is up to date with urology. 5.  Preventive medicine. A colonoscopy is pending for later this spring. A Medicare Wellness Visit in six months. Review of  Systems:   Notable for insomnia which requires prn treatment. His cardiologist gave him a mild sedative type sleep medication. Current Outpatient Prescriptions   Medication Sig    temazepam (RESTORIL) 15 mg capsule Take  by mouth nightly as needed for Sleep.  cetirizine (ZYRTEC) 10 mg tablet Take  by mouth daily as needed for Allergies.  rosuvastatin (CRESTOR) 10 mg tablet Take 1 Tab by mouth daily.  lisinopril-hydroCHLOROthiazide (PRINZIDE, ZESTORETIC) 20-12.5 mg per tablet TAKE 1/2 TABLET EVERY MORNING    VIAGRA 100 mg tablet take 1 tablet by mouth daily if needed    KRILL OIL PO Take  by mouth.  melatonin 3 mg tablet Take  by mouth.  cholecalciferol, vitamin D3, 2,000 unit tab Take  by mouth.  amLODIPine (NORVASC) 5 mg tablet Take 5 mg by mouth daily.  fluticasone (FLONASE) 50 mcg/actuation nasal spray 2 sprays by Both Nostrils route daily.  UBIDECARENONE/VITAMIN E MIXED (COQ10  PO) Take  by mouth.  aspirin 81 mg Tab Take 1 Tab by mouth every morning. No current facility-administered medications for this visit. Review of Systems   Constitutional: Negative for weight loss. Respiratory: Negative. Cardiovascular: Negative for chest pain, palpitations, leg swelling and PND. Musculoskeletal: Negative for myalgias. Neurological: Negative for focal weakness.    Psychiatric/Behavioral: The patient has insomnia. Physical Exam   Constitutional: No distress. Neck: Carotid bruit is not present. Cardiovascular: Normal rate and regular rhythm. Exam reveals no gallop and no friction rub. No murmur heard. Pulmonary/Chest: Effort normal and breath sounds normal. No respiratory distress. Musculoskeletal: He exhibits no edema. Nursing note and vitals reviewed. ASSESSMENT and PLAN  Diagnoses and all orders for this visit:    1. Mixed hyperlipidemia  -     LIPID PANEL    2. Malignant neoplasm of prostate [facility])- See urologist as directed     3. Essential hypertension, benign  -     METABOLIC PANEL, COMPREHENSIVE    4. Atherosclerosis of native coronary artery of native heart without angina pectoris- See cardiologist as directed.  Continue current regimen of prescription and / or OTC medications

## 2018-04-17 ENCOUNTER — DOCUMENTATION ONLY (OUTPATIENT)
Dept: FAMILY MEDICINE CLINIC | Age: 73
End: 2018-04-17

## 2018-04-23 ENCOUNTER — DOCUMENTATION ONLY (OUTPATIENT)
Dept: FAMILY MEDICINE CLINIC | Age: 73
End: 2018-04-23

## 2018-05-25 ENCOUNTER — ANESTHESIA EVENT (OUTPATIENT)
Dept: ENDOSCOPY | Age: 73
End: 2018-05-25
Payer: MEDICARE

## 2018-05-25 ENCOUNTER — HOSPITAL ENCOUNTER (OUTPATIENT)
Age: 73
Setting detail: OUTPATIENT SURGERY
Discharge: HOME | End: 2018-05-25
Attending: INTERNAL MEDICINE | Admitting: INTERNAL MEDICINE
Payer: MEDICARE

## 2018-05-25 ENCOUNTER — ANESTHESIA (OUTPATIENT)
Dept: ENDOSCOPY | Age: 73
End: 2018-05-25
Payer: MEDICARE

## 2018-05-25 VITALS
TEMPERATURE: 97.7 F | DIASTOLIC BLOOD PRESSURE: 71 MMHG | OXYGEN SATURATION: 96 % | RESPIRATION RATE: 23 BRPM | HEIGHT: 71 IN | SYSTOLIC BLOOD PRESSURE: 134 MMHG | WEIGHT: 200 LBS | HEART RATE: 69 BPM | BODY MASS INDEX: 28 KG/M2

## 2018-05-25 PROCEDURE — 74011000250 HC RX REV CODE- 250

## 2018-05-25 PROCEDURE — 77030027957 HC TBNG IRR ENDOGTR BUSS -B: Performed by: INTERNAL MEDICINE

## 2018-05-25 PROCEDURE — 74011250636 HC RX REV CODE- 250/636

## 2018-05-25 PROCEDURE — 76060000031 HC ANESTHESIA FIRST 0.5 HR: Performed by: INTERNAL MEDICINE

## 2018-05-25 PROCEDURE — 74011250636 HC RX REV CODE- 250/636: Performed by: INTERNAL MEDICINE

## 2018-05-25 PROCEDURE — 76040000019: Performed by: INTERNAL MEDICINE

## 2018-05-25 RX ORDER — ATROPINE SULFATE 0.1 MG/ML
0.5 INJECTION INTRAVENOUS
Status: DISCONTINUED | OUTPATIENT
Start: 2018-05-25 | End: 2018-05-25 | Stop reason: HOSPADM

## 2018-05-25 RX ORDER — FENTANYL CITRATE 50 UG/ML
100 INJECTION, SOLUTION INTRAMUSCULAR; INTRAVENOUS
Status: DISCONTINUED | OUTPATIENT
Start: 2018-05-25 | End: 2018-05-25 | Stop reason: HOSPADM

## 2018-05-25 RX ORDER — EPINEPHRINE 0.1 MG/ML
1 INJECTION INTRACARDIAC; INTRAVENOUS
Status: DISCONTINUED | OUTPATIENT
Start: 2018-05-25 | End: 2018-05-25 | Stop reason: HOSPADM

## 2018-05-25 RX ORDER — PROPOFOL 10 MG/ML
INJECTION, EMULSION INTRAVENOUS AS NEEDED
Status: DISCONTINUED | OUTPATIENT
Start: 2018-05-25 | End: 2018-05-25 | Stop reason: HOSPADM

## 2018-05-25 RX ORDER — DEXTROMETHORPHAN/PSEUDOEPHED 2.5-7.5/.8
1.2 DROPS ORAL
Status: DISCONTINUED | OUTPATIENT
Start: 2018-05-25 | End: 2018-05-25 | Stop reason: HOSPADM

## 2018-05-25 RX ORDER — SODIUM CHLORIDE 9 MG/ML
50 INJECTION, SOLUTION INTRAVENOUS CONTINUOUS
Status: DISCONTINUED | OUTPATIENT
Start: 2018-05-25 | End: 2018-05-25 | Stop reason: HOSPADM

## 2018-05-25 RX ORDER — NALOXONE HYDROCHLORIDE 0.4 MG/ML
0.4 INJECTION, SOLUTION INTRAMUSCULAR; INTRAVENOUS; SUBCUTANEOUS
Status: DISCONTINUED | OUTPATIENT
Start: 2018-05-25 | End: 2018-05-25 | Stop reason: HOSPADM

## 2018-05-25 RX ORDER — SODIUM CHLORIDE 9 MG/ML
INJECTION, SOLUTION INTRAVENOUS
Status: DISCONTINUED | OUTPATIENT
Start: 2018-05-25 | End: 2018-05-25 | Stop reason: HOSPADM

## 2018-05-25 RX ORDER — LIDOCAINE HYDROCHLORIDE 20 MG/ML
INJECTION, SOLUTION EPIDURAL; INFILTRATION; INTRACAUDAL; PERINEURAL AS NEEDED
Status: DISCONTINUED | OUTPATIENT
Start: 2018-05-25 | End: 2018-05-25 | Stop reason: HOSPADM

## 2018-05-25 RX ORDER — SODIUM CHLORIDE 0.9 % (FLUSH) 0.9 %
5-10 SYRINGE (ML) INJECTION EVERY 8 HOURS
Status: DISCONTINUED | OUTPATIENT
Start: 2018-05-25 | End: 2018-05-25 | Stop reason: HOSPADM

## 2018-05-25 RX ORDER — MIDAZOLAM HYDROCHLORIDE 1 MG/ML
.25-1 INJECTION, SOLUTION INTRAMUSCULAR; INTRAVENOUS
Status: DISCONTINUED | OUTPATIENT
Start: 2018-05-25 | End: 2018-05-25 | Stop reason: HOSPADM

## 2018-05-25 RX ORDER — CIPROFLOXACIN 500 MG/1
500 TABLET ORAL 2 TIMES DAILY
COMMUNITY
End: 2018-10-29 | Stop reason: ALTCHOICE

## 2018-05-25 RX ORDER — SODIUM CHLORIDE 0.9 % (FLUSH) 0.9 %
5-10 SYRINGE (ML) INJECTION AS NEEDED
Status: DISCONTINUED | OUTPATIENT
Start: 2018-05-25 | End: 2018-05-25 | Stop reason: HOSPADM

## 2018-05-25 RX ORDER — FLUMAZENIL 0.1 MG/ML
0.2 INJECTION INTRAVENOUS
Status: DISCONTINUED | OUTPATIENT
Start: 2018-05-25 | End: 2018-05-25 | Stop reason: HOSPADM

## 2018-05-25 RX ADMIN — PROPOFOL 50 MG: 10 INJECTION, EMULSION INTRAVENOUS at 08:37

## 2018-05-25 RX ADMIN — PROPOFOL 50 MG: 10 INJECTION, EMULSION INTRAVENOUS at 08:26

## 2018-05-25 RX ADMIN — PROPOFOL 50 MG: 10 INJECTION, EMULSION INTRAVENOUS at 08:28

## 2018-05-25 RX ADMIN — PROPOFOL 50 MG: 10 INJECTION, EMULSION INTRAVENOUS at 08:31

## 2018-05-25 RX ADMIN — SODIUM CHLORIDE: 9 INJECTION, SOLUTION INTRAVENOUS at 08:15

## 2018-05-25 RX ADMIN — PROPOFOL 50 MG: 10 INJECTION, EMULSION INTRAVENOUS at 08:34

## 2018-05-25 RX ADMIN — LIDOCAINE HYDROCHLORIDE 40 MG: 20 INJECTION, SOLUTION EPIDURAL; INFILTRATION; INTRACAUDAL; PERINEURAL at 08:26

## 2018-05-25 NOTE — PROGRESS NOTES

## 2018-05-25 NOTE — PROCEDURES
[address] [facility].  [address] [place] [address] E Jessy Chase, [address]  [phone]                              Colonoscopy Procedure Note      Indications:    Personal history of colon polyps (screening only)     :  Mary Riley MD    Referring Provider: Vik Coley MD    Sedation:  MAC anesthesia    Procedure Details:  After informed consent was obtained with all risks and benefits of procedure explained and preoperative exam completed, the patient was taken to the endoscopy suite and placed in the left lateral decubitus position. Upon sequential sedation as per above, a digital rectal exam was performed  And was normal.  The Olympus videocolonoscope  was inserted in the rectum and carefully advanced to the cecum, which was identified by the ileocecal valve and appendiceal orifice. The quality of preparation was inadequate. The colonoscope was slowly withdrawn with careful evaluation between folds. Retroflexion in the rectum was performed and was normal..     Findings:   Rectum: no mucosal lesion appreciated  Small internal hemorrhoids were seen  Sigmoid: no mucosal lesion appreciated  Descending Colon: no mucosal lesion appreciated  Transverse Colon: no mucosal lesion appreciated  Ascending Colon: no mucosal lesion appreciated  Cecum: no mucosal lesion appreciated  Terminal Ileum: not intubated    Interventions:  none    Specimen Removed:  * No specimens in log *    Complications: None. EBL:  None. Recommendations:   -High fiber diet. -Repeat colonoscopy in 1 year with double prep    -Resume normal medication(s). Discharge Disposition:  Home in the company of a  when able to ambulate.     Mary Riley MD  5/25/2018  8:43 AM

## 2018-05-25 NOTE — IP AVS SNAPSHOT
[address] 
[phone] Patient: Cornel Hernandez MRN: [id number] YYE:[birth year] About your hospitalization You were admitted on:  May 25, 2018 You last received care in the:  [facility] ENDOSCOPY You were discharged on:  May 25, 2018 Why you were hospitalized Your primary diagnosis was:  Not on File Follow-up Information None Discharge Orders None A check arina indicates which time of day the medication should be taken. My Medications CONTINUE taking these medications Instructions Each Dose to Equal  
 Morning Noon Evening Bedtime  
 amLODIPine 5 mg tablet Commonly known as:  Jaswinder Lute Your last dose was: Your next dose is: Take 5 mg by mouth daily. 5 mg  
    
   
   
   
  
 aspirin 81 mg tablet Your last dose was: Your next dose is: Take 1 Tab by mouth every morning. 1 Tab  
    
   
   
   
  
 cholecalciferol (vitamin D3) 2,000 unit Tab Your last dose was: Your next dose is: Take  by mouth. CIPRO 500 mg tablet Generic drug:  ciprofloxacin HCl Your last dose was: Your next dose is: Take 500 mg by mouth two (2) times a day. Ordered for 21 days for bladder infection. 500 mg  
    
   
   
   
  
 COQ10  PO Your last dose was: Your next dose is: Take  by mouth. fluticasone 50 mcg/actuation nasal spray Commonly known as:  Terri Harper Your last dose was: Your next dose is:    
   
   
 2 sprays by Both Nostrils route daily. 2 Kinder KRILL OIL PO Your last dose was: Your next dose is: Take  by mouth.  
     
   
   
   
  
 lisinopril-hydroCHLOROthiazide 20-12.5 mg per tablet Commonly known as:  Cesia Kern  
   
 Your last dose was: Your next dose is: TAKE 1/2 TABLET EVERY MORNING  
     
   
   
   
  
 melatonin 3 mg tablet Your last dose was: Your next dose is: Take  by mouth. rosuvastatin 10 mg tablet Commonly known as:  CRESTOR Your last dose was: Your next dose is: Take 1 Tab by mouth daily. 10 mg  
    
   
   
   
  
 temazepam 15 mg capsule Commonly known as:  RESTORIL Your last dose was: Your next dose is: Take  by mouth nightly as needed for Sleep. VIAGRA 100 mg tablet Generic drug:  sildenafil citrate Your last dose was: Your next dose is:    
   
   
 take 1 tablet by mouth daily if needed ZyrTEC 10 mg tablet Generic drug:  cetirizine Your last dose was: Your next dose is: Take  by mouth daily as needed for Allergies. Discharge Instructions [facility]. 
[address]  
[phone] Judah Alonso 
[id number] 1945 COLON DISCHARGE INSTRUCTIONS DISCOMFORT: 
Redness at IV site- apply warm compress to area; if redness or soreness persist- contact your physician There may be a slight amount of blood passed from the rectum Gaseous discomfort- walking, belching will help relieve any discomfort You may not operate a vehicle for 12 hours You may not  engage in an occupation involving machinery or appliances for rest of today You may not  drink alcoholic beverages for at least 12 hours Avoid making any critical decisions for at least 24 hour DIET: 
 High fiber diet.  however -  remember your colon is empty and a heavy meal will produce gas. Avoid these foods:  vegetables, fried / greasy foods, carbonated drinks for today ACTIVITY: 
 It is recommended that you spend the remainder of the day resting -  avoid any strenuous activity. CALL M.D. ANY SIGN OF: Increasing pain, nausea, vomiting Abdominal distension (swelling) New increased bleeding (oral or rectal) Fever (chills) Pain in chest area Bloody discharge from nose or mouth Shortness of breath Post procedure diagnosis:  1.- Inadequate Prep, repeat in 1 year with double prep Follow-up Instructions: 
 
 
 
Call Dr. Sukhwinder Ramos for any questions or problems. If we took a biopsy please call the office within 2 weeks to discuss your                             pathology results. Telephone # [phone] ACO Transitions of Care Introducing [employer] 50 Shanelle Luis offers a voluntary care coordination program to provide high quality service and care to [facility] fee-for-service beneficiaries. Mookie Bowie was designed to help you enhance your health and well-being through the following services: ? Transitions of Care  support for individuals who are transitioning from one care setting to another (example: Hospital to home). ? Chronic and Complex Care Coordination  support for individuals and caregivers of those with serious or chronic illnesses or with more than one chronic (ongoing) condition and those who take a number of different medications. If you meet specific medical criteria, a [facility] [address] may call you directly to coordinate your care with your primary care physician and your other care providers. For questions about the [facility] programs, please, contact your physicians office. For general questions or additional information about Accountable Care Organizations: 
Please visit www.medicare.gov/acos. html or call 1-800-MEDICARE (0-980.705.2877) TTY users should call 6-622.424.5582. Introducing [facility] & HEALTH SERVICES! Reece Bunch Pietro: Thank you for requesting a RedSeal Networks account. Our records indicate that you already have an active RedSeal Networks account. You can access your account anytime at [email or web address] Did you know that you can access your hospital and ER discharge instructions at any time in RedSeal Networks? You can also review all of your test results from your hospital stay or ER visit. Additional Information If you have questions, please visit the Frequently Asked Questions section of the RedSeal Networks website at [email or web address] Remember, RedSeal Networks is NOT to be used for urgent needs. For medical emergencies, dial 911. Now available from your iPhone and Android! Introducing Francis Nazario As a [facility] patient, I wanted to make you aware of our electronic visit tool called Francis Nazario. RiversKarmarama allows you to connect within minutes with a medical provider 24 hours a day, seven days a week via a mobile device or tablet or logging into a secure website from your computer. You can access Francis Nazario from anywhere in the [place]. A virtual visit might be right for you when you have a simple condition and feel like you just dont want to get out of bed, or cant get away from work for an appointment, when your regular [facility] provider is not available (evenings, weekends or holidays), or when youre out of town and need minor care. Electronic visits cost only $49 and if the RiverscottonTracks/Cree provider determines a prescription is needed to treat your condition, one can be electronically transmitted to a nearby pharmacy*. Please take a moment to enroll today if you have not already done so. The enrollment process is free and takes just a few minutes. To enroll, please download the TalkBox Limited/Cree wyatt to your tablet or phone, or visit [email or web address] to enroll on your computer. And, as an [address] [place] [address] patient with a Storehouse account, the results of your visits will be scanned into your electronic medical record and your primary care provider will be able to view the scanned results. We urge you to continue to see your regular [facility] provider for your ongoing medical care. And while your primary care provider may not be the one available when you seek a Francis Rubyfin virtual visit, the peace of mind you get from getting a real diagnosis real time can be priceless. For more information on Francis First30Daysfabiolafin, view our Frequently Asked Questions (FAQs) at [email or web address]. Sincerely, 
 
Erick Boyle MD 
Chief Medical Officer [employer] *:  certain medications cannot be prescribed via [employer] Providers Seen During Your Hospitalization Provider Specialty Primary office phone Kelly Fang MD Gastroenterology [phone] Your Primary Care Physician (PCP) Primary Care Physician Office Phone Office Fax Ammon Sherrie [phone] You are allergic to the following Allergen Reactions Pcn (Penicillins) Rash Recent Documentation Height Weight BMI Smoking Status 1.803 m 90.7 kg 27.89 kg/m2 Never Smoker Emergency Contacts Name Discharge Info Relation Home Work Mobile [address] CAREGIVER [3] Other Relative [6] [phone] [place]  Child [2] [phone] Mandi Ferris  Other Relative [6] [phone] Lanette Rob  Spouse [3] [phone] Patient Belongings The following personal items are in your possession at time of discharge: 
  Dental Appliances: None  Visual Aid: Glasses, With patient Please provide this summary of care documentation to your next provider.  
  
  
 
  
Signatures-by signing, you are acknowledging that this After Visit Summary has been reviewed with you and you have received a copy. Patient Signature:  ____________________________________________________________ Date:  ____________________________________________________________  
  
Connye Brod Provider Signature:  ____________________________________________________________ Date:  ____________________________________________________________

## 2018-05-25 NOTE — DISCHARGE INSTRUCTIONS
[address]  [address]  [phone]  1945    COLON DISCHARGE INSTRUCTIONS    DISCOMFORT:  Redness at IV site- apply warm compress to area; if redness or soreness persist- contact your physician  There may be a slight amount of blood passed from the rectum  Gaseous discomfort- walking, belching will help relieve any discomfort  You may not operate a vehicle for 12 hours  You may not  engage in an occupation involving machinery or appliances for rest of today  You may not  drink alcoholic beverages for at least 12 hours  Avoid making any critical decisions for at least 24 hour    DIET:   High fiber diet. - however -  remember your colon is empty and a heavy meal will produce gas. Avoid these foods:  vegetables, fried / greasy foods, carbonated drinks for today     ACTIVITY:  It is recommended that you spend the remainder of the day resting -  avoid any strenuous activity. CALL M.D. ANY SIGN OF:   Increasing pain, nausea, vomiting  Abdominal distension (swelling)  New increased bleeding (oral or rectal)  Fever (chills)  Pain in chest area  Bloody discharge from nose or mouth  Shortness of breath    Post procedure diagnosis:  1.- Inadequate Prep, repeat in 1 year with double prep    Follow-up Instructions:        Call Dr. Gilda Chirinos for any questions or problems. If we took a biopsy please call the office within 2 weeks to discuss your                             pathology results.  Telephone # [phone]

## 2018-05-25 NOTE — ROUTINE PROCESS
Nano Xiao  1945  [id number]    Situation:  Verbal report received from: Vianney Campbell  Procedure: Procedure(s):  COLONOSCOPY    Background:    Preoperative diagnosis: PERSONAL HISTORY OF COLONIC POLYPS   Postoperative diagnosis: 1.- Inadequate Prep    :  Dr. Princess Crouch  Assistant(s): Endoscopy Technician-1: Blade Rosa  Endoscopy RN-1: Genesis Virgen RN    Specimens: * No specimens in log *  H. Pylori  no    Assessment:  Intra-procedure medications     Anesthesia gave intra-procedure sedation and medications, see anesthesia flow sheet yes    Intravenous fluids: NS@ KVO     Vital signs stable yes    Abdominal assessment: round and soft  Yes     Recommendation:  Discharge patient per MD order yes .   Return to floor na   Family or Friend fam  Permission to share finding with family or friend yes

## 2018-05-25 NOTE — ANESTHESIA POSTPROCEDURE EVALUATION
Post-Anesthesia Evaluation and Assessment    Patient: Justen Garcia MRN: [id number]  SSN: [id number]    YOB: 1945  Age: 75 y.o. Sex: male       Cardiovascular Function/Vital Signs  Visit Vitals    /71    Pulse 69    Temp 36.5 °C (97.7 °F)    Resp 23    Ht 5' 11\" (1.803 m)    Wt 90.7 kg (200 lb)    SpO2 96%    BMI 27.89 kg/m2       Patient is status post MAC anesthesia for Procedure(s):  COLONOSCOPY. Nausea/Vomiting: None    Postoperative hydration reviewed and adequate. Pain:  Pain Scale 1: Numeric (0 - 10) (05/25/18 0910)  Pain Intensity 1: 0 (05/25/18 0910)   Managed    Neurological Status: At baseline    Mental Status and Level of Consciousness: Arousable    Pulmonary Status:   O2 Device: Room air (05/25/18 0910)   Adequate oxygenation and airway patent    Complications related to anesthesia: None    Post-anesthesia assessment completed.  No concerns    Signed By: Neel Oshea MD     May 25, 2018

## 2018-05-25 NOTE — ANESTHESIA PREPROCEDURE EVALUATION
Anesthetic History   No history of anesthetic complications            Review of Systems / Medical History  Patient summary reviewed, nursing notes reviewed and pertinent labs reviewed    Pulmonary  Within defined limits                 Neuro/Psych   Within defined limits           Cardiovascular    Hypertension: well controlled          CAD    Exercise tolerance: >4 METS  Comments: EF 50%   GI/Hepatic/Renal               Comments: Hx polyps Endo/Other             Other Findings            Physical Exam    Airway  Mallampati: I  TM Distance: > 6 cm  Neck ROM: normal range of motion   Mouth opening: Normal     Cardiovascular    Rhythm: regular  Rate: normal         Dental  No notable dental hx       Pulmonary  Breath sounds clear to auscultation               Abdominal         Other Findings            Anesthetic Plan    ASA: 2  Anesthesia type: MAC          Induction: Intravenous  Anesthetic plan and risks discussed with: Patient

## 2018-05-25 NOTE — H&P
[address]  [address] [place], [address]  [phone]                                History and Physical     NAME: Kym Griggs   :  [birth year]   MRN:  [id number]     HPI:  The patient was seen and examined. Date of last colonoscopy: 5 years, Polyps  Yes     Past Surgical History:   Procedure Laterality Date    ENDOSCOPY, COLON, DIAGNOSTIC       due 18- see my chart message 17. Past Medical History:   Diagnosis Date    Colon polyps     ED (erectile dysfunction)     Hypercholesterolemia     Hypertension      Social History   Substance Use Topics    Smoking status: Never Smoker    Smokeless tobacco: Never Used    Alcohol use 0.0 oz/week     0 Standard drinks or equivalent per week      Comment: weekend drinks social     Allergies   Allergen Reactions    Pcn [Penicillins] Rash     Family History   Problem Relation Age of Onset    Heart Disease Father     Diabetes Father     Heart Disease Sister     Depression Brother      Current Facility-Administered Medications   Medication Dose Route Frequency    0.9% sodium chloride infusion  50 mL/hr IntraVENous CONTINUOUS    sodium chloride (NS) flush 5-10 mL  5-10 mL IntraVENous Q8H    sodium chloride (NS) flush 5-10 mL  5-10 mL IntraVENous PRN    midazolam (VERSED) injection 0.25-10 mg  0.25-10 mg IntraVENous Multiple    fentaNYL citrate (PF) injection 100 mcg  100 mcg IntraVENous MULTIPLE DOSE GIVEN    naloxone (NARCAN) injection 0.4 mg  0.4 mg IntraVENous Multiple    flumazenil (ROMAZICON) 0.1 mg/mL injection 0.2 mg  0.2 mg IntraVENous Multiple    simethicone (MYLICON) 55LJ/4.1WB oral drops 80 mg  1.2 mL Oral Multiple    atropine injection 0.5 mg  0.5 mg IntraVENous ONCE PRN    EPINEPHrine (ADRENALIN) 0.1 mg/mL syringe 1 mg  1 mg Endoscopically ONCE PRN         PHYSICAL EXAM:  General: WD, WN. Alert, cooperative, no acute distress    HEENT: NC, Atraumatic. PERRLA, EOMI.  Anicteric sclerae. Lungs:  CTA Bilaterally. No Wheezing/Rhonchi/Rales. Heart:  Regular  rhythm,  No murmur, No Rubs, No Gallops  Abdomen: Soft, Non distended, Non tender.  +Bowel sounds, no HSM  Extremities: No c/c/e  Neurologic:  CN 2-12 gi, Alert and oriented X 3. No acute neurological distress   Psych:   Good insight. Not anxious nor agitated. The heart, lungs and mental status were satisfactory for the administration of MAC sedation and for the procedure.       Mallampati score: 3       Assessment:   · Personal history colon polyps    Plan:   · Endoscopic procedure  · MAC sedation   ·   ·

## 2018-05-29 ENCOUNTER — PATIENT MESSAGE (OUTPATIENT)
Dept: INTERNAL MEDICINE CLINIC | Age: 73
End: 2018-05-29

## 2018-05-30 RX ORDER — SILDENAFIL 100 MG/1
TABLET, FILM COATED ORAL
Qty: 12 TAB | Refills: 99 | Status: SHIPPED | OUTPATIENT
Start: 2018-05-30 | End: 2018-11-14 | Stop reason: SDUPTHER

## 2018-05-30 NOTE — TELEPHONE ENCOUNTER
Nallely Blackburn MD 5/30/2018 6:18 AM EDT    Ok to proceed as requested  ----- Message -----   From: Nallely Blackburn MD   Sent: 5/30/2018 6:18 AM   To: Nallely Blackburn MD  Subject: FW: Prescription Question         ----- Message -----   From: Reina Rea   Sent: 5/29/2018 9:17 PM   To: [facility]  Subject: Prescription Question     ,I would like for you to write a Rx for Viagra 100 mg. 12 count . I split these into quarters to lower my overall cost. You can send the hard copy to my home address of [address] [place] [address]. Anuja Camarillo Thank you.  Olivia

## 2018-07-26 DIAGNOSIS — E78.2 MIXED HYPERLIPIDEMIA: ICD-10-CM

## 2018-07-28 RX ORDER — ROSUVASTATIN CALCIUM 10 MG/1
TABLET, COATED ORAL
Qty: 90 TAB | Refills: 3 | Status: SHIPPED | OUTPATIENT
Start: 2018-07-28 | End: 2018-11-01

## 2018-09-27 ENCOUNTER — HOSPITAL ENCOUNTER (OUTPATIENT)
Dept: ULTRASOUND IMAGING | Age: 73
Discharge: HOME | End: 2018-09-27
Attending: OTOLARYNGOLOGY
Payer: MEDICARE

## 2018-09-27 DIAGNOSIS — D11.0 BENIGN TUMOR OF PAROTID GLAND: ICD-10-CM

## 2018-09-27 PROCEDURE — 76536 US EXAM OF HEAD AND NECK: CPT

## 2018-10-09 RX ORDER — LISINOPRIL AND HYDROCHLOROTHIAZIDE 12.5; 2 MG/1; MG/1
TABLET ORAL
Qty: 45 TAB | Refills: 3 | Status: SHIPPED | COMMUNITY
Start: 2018-10-09 | End: 2019-10-28 | Stop reason: SDUPTHER

## 2018-10-29 ENCOUNTER — OFFICE VISIT (OUTPATIENT)
Dept: INTERNAL MEDICINE CLINIC | Age: 73
End: 2018-10-29

## 2018-10-29 ENCOUNTER — HOSPITAL ENCOUNTER (OUTPATIENT)
Dept: LAB | Age: 73
Discharge: HOME | End: 2018-10-29
Payer: MEDICARE

## 2018-10-29 VITALS
BODY MASS INDEX: 28.7 KG/M2 | RESPIRATION RATE: 16 BRPM | SYSTOLIC BLOOD PRESSURE: 138 MMHG | DIASTOLIC BLOOD PRESSURE: 80 MMHG | TEMPERATURE: 97.6 F | HEIGHT: 71 IN | OXYGEN SATURATION: 97 % | WEIGHT: 205 LBS | HEART RATE: 72 BPM

## 2018-10-29 DIAGNOSIS — E78.2 MIXED HYPERLIPIDEMIA: ICD-10-CM

## 2018-10-29 DIAGNOSIS — I25.10 ATHEROSCLEROSIS OF NATIVE CORONARY ARTERY OF NATIVE HEART WITHOUT ANGINA PECTORIS: ICD-10-CM

## 2018-10-29 DIAGNOSIS — I10 ESSENTIAL HYPERTENSION, BENIGN: ICD-10-CM

## 2018-10-29 DIAGNOSIS — Z00.00 MEDICARE ANNUAL WELLNESS VISIT, SUBSEQUENT: Primary | ICD-10-CM

## 2018-10-29 DIAGNOSIS — Z13.31 SCREENING FOR DEPRESSION: ICD-10-CM

## 2018-10-29 DIAGNOSIS — C61 MALIGNANT NEOPLASM OF PROSTATE (HCC): ICD-10-CM

## 2018-10-29 PROCEDURE — 80061 LIPID PANEL: CPT

## 2018-10-29 PROCEDURE — 36415 COLL VENOUS BLD VENIPUNCTURE: CPT

## 2018-10-29 PROCEDURE — 80053 COMPREHEN METABOLIC PANEL: CPT

## 2018-10-29 NOTE — PROGRESS NOTES
HISTORY OF PRESENT ILLNESS  Cecy Mendoza is a 75 y.o. male. DB Baker is seen today for a Medicare wellness visit, follow-up of chronic problems. 1. Medicare wellness visit. Please see attached note. Regarding preventative medicine, this was fully reviewed. He is due for a complete examination and routine labs. He is up-to-date with colonoscopy, urology follow-up and appropriate vaccinations. 2. Chronic problems are reviewed. Hyperlipidemia, reviewed labs, stable. CAD based on calcium scoring. Cardiology follow-up is in the near future. He denies any active symptoms. Prostate cancer, up-to-date with urology follow-up. Hypertension, stable on current regimen. Review of Systems:  Notable for some urinary retention when he took an antihistamine with a decongestant and he followed up with urology and now knows to avoid these. Current Outpatient Medications   Medication Sig    lisinopril-hydroCHLOROthiazide (PRINZIDE, ZESTORETIC) 20-12.5 mg per tablet TAKE 1/2 TABLET EVERY MORNING    rosuvastatin (CRESTOR) 10 mg tablet TAKE 1 TABLET EVERY DAY    sildenafil citrate (VIAGRA) 100 mg tablet take 1 tablet by mouth daily if needed    temazepam (RESTORIL) 15 mg capsule Take  by mouth nightly as needed for Sleep.  cetirizine (ZYRTEC) 10 mg tablet Take  by mouth daily as needed for Allergies.  KRILL OIL PO Take  by mouth.  melatonin 3 mg tablet Take  by mouth.  amLODIPine (NORVASC) 5 mg tablet Take 5 mg by mouth daily.  UBIDECARENONE/VITAMIN E MIXED (COQ10  PO) Take  by mouth.  aspirin 81 mg Tab Take 1 Tab by mouth every morning. No current facility-administered medications for this visit. Review of Systems   Constitutional: Negative for weight loss. Respiratory: Negative. Cardiovascular: Negative for chest pain, palpitations, leg swelling and PND. Gastrointestinal: Negative. Genitourinary: Positive for frequency.    Musculoskeletal: Negative for myalgias. Neurological: Negative for focal weakness. Physical Exam   Constitutional: He is oriented to person, place, and time. He appears well-developed and well-nourished. No distress. HENT:   Head: Normocephalic and atraumatic. Right Ear: Tympanic membrane, external ear and ear canal normal.   Left Ear: Tympanic membrane, external ear and ear canal normal.   Eyes: EOM are normal. Pupils are equal, round, and reactive to light. Right eye exhibits no discharge. Left eye exhibits no discharge. Neck: Normal range of motion. Neck supple. Carotid bruit is not present. No thyromegaly present. Cardiovascular: Normal rate, regular rhythm, normal heart sounds and intact distal pulses. Exam reveals no gallop and no friction rub. No murmur heard. Pulmonary/Chest: Effort normal and breath sounds normal. No respiratory distress. He has no wheezes. He has no rales. Abdominal: Soft. Bowel sounds are normal. He exhibits no distension and no mass. There is no tenderness. There is no rebound and no guarding. Musculoskeletal: Normal range of motion. He exhibits no edema or tenderness. Lymphadenopathy:     He has no cervical adenopathy. Neurological: He is alert and oriented to person, place, and time. He has normal reflexes. Skin: Skin is warm and dry. No rash noted. Psychiatric: He has a normal mood and affect. His behavior is normal.   Nursing note and vitals reviewed. ASSESSMENT and PLAN  Diagnoses and all orders for this visit:    1. Medicare annual wellness visit, subsequent    2. Mixed hyperlipidemia  -     METABOLIC PANEL, COMPREHENSIVE  -     CBC WITH AUTOMATED DIFF  -     LIPID PANEL  -     TSH 3RD GENERATION    3. Malignant neoplasm of prostate [facility])- See urologist as directed     4. Essential hypertension, benign- Continue current regimen of prescription and / or OTC medications     5.  Atherosclerosis of native coronary artery of native heart without angina pectoris- Continue current regimen of prescription and / or OTC medications , Continue other medications in addition to current changes     6. Screening for depression  -     James Fairchild      This is the Subsequent Medicare Annual Wellness Exam, performed 12 months or more after the Initial AWV or the last Subsequent AWV    I have reviewed the patient's medical history in detail and updated the computerized patient record. History     Past Medical History:   Diagnosis Date    Colon polyps     ED (erectile dysfunction)     Hypercholesterolemia     Hypertension       Past Surgical History:   Procedure Laterality Date    ENDOSCOPY, COLON, DIAGNOSTIC      08, 14 due 18- see my chart message 5/12/17. Current Outpatient Medications   Medication Sig Dispense Refill    lisinopril-hydroCHLOROthiazide (PRINZIDE, ZESTORETIC) 20-12.5 mg per tablet TAKE 1/2 TABLET EVERY MORNING 45 Tab 3    rosuvastatin (CRESTOR) 10 mg tablet TAKE 1 TABLET EVERY DAY 90 Tab 3    sildenafil citrate (VIAGRA) 100 mg tablet take 1 tablet by mouth daily if needed 12 Tab 99    temazepam (RESTORIL) 15 mg capsule Take  by mouth nightly as needed for Sleep.  cetirizine (ZYRTEC) 10 mg tablet Take  by mouth daily as needed for Allergies.  KRILL OIL PO Take  by mouth.  melatonin 3 mg tablet Take  by mouth.  amLODIPine (NORVASC) 5 mg tablet Take 5 mg by mouth daily.  UBIDECARENONE/VITAMIN E MIXED (COQ10  PO) Take  by mouth.  aspirin 81 mg Tab Take 1 Tab by mouth every morning. Allergies   Allergen Reactions    Pcn [Penicillins] Rash     Family History   Problem Relation Age of Onset    Heart Disease Father     Diabetes Father     Heart Disease Sister     Depression Brother      Social History     Tobacco Use    Smoking status: Never Smoker    Smokeless tobacco: Never Used   Substance Use Topics    Alcohol use:  Yes     Alcohol/week: 0.0 oz     Comment: weekend drinks social     Patient Active Problem List Diagnosis Code    Mixed hyperlipidemia E78.2    Essential hypertension, benign I10    Benign neoplasm of colon D12.6    Hypoactive sexual desire disorder F52.0    Malignant neoplasm of prostate ([facility] [address] C61    Encounter for long-term (current) use of medications Z79.899    Nasal congestion R09.81    Impotence of organic origin N52.9    Atherosclerosis of native coronary artery without angina pectoris I25.10    Advance directive discussed with patient Z71.89       Depression Risk Factor Screening:     PHQ over the last two weeks 4/10/2018   Little interest or pleasure in doing things Not at all   Feeling down, depressed, irritable, or hopeless Not at all   Total Score PHQ 2 0     Alcohol Risk Factor Screening: You do not drink alcohol or very rarely. Functional Ability and Level of Safety:   Hearing Loss  Hearing is mildly deficient    Activities of Daily Living  The home contains: no safety equipment. Patient does total self care    Fall Risk  Fall Risk Assessment, last 12 mths 4/10/2018   Able to walk? Yes   Fall in past 12 months? No   Fall with injury? -   Number of falls in past 12 months -   Fall Risk Score -       Abuse Screen  Patient is not abused    Cognitive Screening   Evaluation of Cognitive Function:  Has your family/caregiver stated any concerns about your memory: no  Normal    Patient Care Team   Patient Care Team:  Linda Coello MD as PCP - [facility]  Sivan Deluca OD (Ophthalmology)  Ann New MD (Gastroenterology)    Assessment/Plan   Education and counseling provided:  Are appropriate based on today's review and evaluation    Diagnoses and all orders for this visit:    1. Medicare annual wellness visit, subsequent    2. Mixed hyperlipidemia  -     METABOLIC PANEL, COMPREHENSIVE  -     CBC WITH AUTOMATED DIFF  -     LIPID PANEL  -     TSH 3RD GENERATION    3. Malignant neoplasm of prostate ([facility] [address]    4. Essential hypertension, benign    5.  Atherosclerosis of native coronary artery of native heart without angina pectoris    6. Screening for depression  -     DEPRESSION SCREEN ANNUAL        There are no preventive care reminders to display for this patient.

## 2018-10-29 NOTE — PATIENT INSTRUCTIONS
Medicare Wellness Visit, Male The best way to live healthy is to have a lifestyle where you eat a well-balanced diet, exercise regularly, limit alcohol use, and quit all forms of tobacco/nicotine, if applicable. Regular preventive services are another way to keep healthy. Preventive services (vaccines, screening tests, monitoring & exams) can help personalize your care plan, which helps you manage your own care. Screening tests can find health problems at the earliest stages, when they are easiest to treat. [facility] follows the current, evidence-based guidelines published by the [facility] Yovanny Brigitte ([facility]) when recommending preventive services for our patients. Because we follow these guidelines, sometimes recommendations change over time as research supports it. (For example, a prostate screening blood test is no longer routinely recommended for men with no symptoms.) Of course, you and your doctor may decide to screen more often for some diseases, based on your risk and co-morbidities (chronic disease you are already diagnosed with). Preventive services for you include: - Medicare offers their members a free annual wellness visit, which is time for you and your primary care provider to discuss and plan for your preventive service needs. Take advantage of this benefit every year! 
-All adults over age 72 should receive the recommended pneumonia vaccines. Current USPSTF guidelines recommend a series of two vaccines for the best pneumonia protection.  
-All adults should have a flu vaccine yearly and an ECG.  All adults age 61 and older should receive a shingles vaccine once in their lifetime.   
-All adults age 38-68 who are overweight should have a diabetes screening test once every three years.  
-Other screening tests & preventive services for persons with diabetes include: an eye exam to screen for diabetic retinopathy, a kidney function test, a foot exam, and stricter control over your cholesterol.  
-Cardiovascular screening for adults with routine risk involves an electrocardiogram (ECG) at intervals determined by the provider.  
-Colorectal cancer screening should be done for adults age 54-65 with no increased risk factors for colorectal cancer. There are a number of acceptable methods of screening for this type of cancer. Each test has its own benefits and drawbacks. Discuss with your provider what is most appropriate for you during your annual wellness visit. The different tests include: colonoscopy (considered the best screening method), a fecal occult blood test, a fecal DNA test, and sigmoidoscopy. 
-All adults born between [facility] should be screened once for Hepatitis C. 
-An Abdominal Aortic Aneurysm (AAA) Screening is recommended for men age 73-68 who has ever smoked in their lifetime. Here is a list of your current Health Maintenance items (your personalized list of preventive services) with a due date: There are no preventive care reminders to display for this patient.

## 2018-10-30 LAB
ALBUMIN SERPL-MCNC: 4.5 G/DL (ref 3.5–4.8)
ALBUMIN/GLOB SERPL: 1.7 {RATIO} (ref 1.2–2.2)
ALP SERPL-CCNC: 62 IU/L (ref 39–117)
ALT SERPL-CCNC: 18 IU/L (ref 0–44)
AST SERPL-CCNC: 23 IU/L (ref 0–40)
BILIRUB SERPL-MCNC: 0.5 MG/DL (ref 0–1.2)
BUN SERPL-MCNC: 23 MG/DL (ref 8–27)
BUN/CREAT SERPL: 22 (ref 10–24)
CALCIUM SERPL-MCNC: 9.5 MG/DL (ref 8.6–10.2)
CHLORIDE SERPL-SCNC: 107 MMOL/L (ref 96–106)
CHOLEST SERPL-MCNC: 172 MG/DL (ref 100–199)
CO2 SERPL-SCNC: 22 MMOL/L (ref 20–29)
CREAT SERPL-MCNC: 1.06 MG/DL (ref 0.76–1.27)
GLOBULIN SER CALC-MCNC: 2.6 G/DL (ref 1.5–4.5)
GLUCOSE SERPL-MCNC: 91 MG/DL (ref 65–99)
HDLC SERPL-MCNC: 40 MG/DL
LDLC SERPL CALC-MCNC: 98 MG/DL (ref 0–99)
POTASSIUM SERPL-SCNC: 4.4 MMOL/L (ref 3.5–5.2)
PROT SERPL-MCNC: 7.1 G/DL (ref 6–8.5)
SODIUM SERPL-SCNC: 142 MMOL/L (ref 134–144)
TRIGL SERPL-MCNC: 172 MG/DL (ref 0–149)
VLDLC SERPL CALC-MCNC: 34 MG/DL (ref 5–40)

## 2018-11-01 DIAGNOSIS — E78.2 MIXED HYPERLIPIDEMIA: ICD-10-CM

## 2018-11-01 RX ORDER — ROSUVASTATIN CALCIUM 20 MG/1
TABLET, COATED ORAL
Qty: 1 TAB | Refills: 0
Start: 2018-11-01

## 2018-11-14 RX ORDER — SILDENAFIL 100 MG/1
TABLET, FILM COATED ORAL
Qty: 4 TAB | Refills: 98 | Status: SHIPPED | OUTPATIENT
Start: 2018-11-14

## 2019-04-10 ENCOUNTER — HOSPITAL ENCOUNTER (OUTPATIENT)
Dept: LAB | Age: 74
Discharge: HOME | End: 2019-04-10
Payer: MEDICARE

## 2019-04-10 PROCEDURE — 36415 COLL VENOUS BLD VENIPUNCTURE: CPT

## 2019-04-10 PROCEDURE — 85025 COMPLETE CBC W/AUTO DIFF WBC: CPT

## 2019-04-10 PROCEDURE — 80061 LIPID PANEL: CPT

## 2019-04-10 PROCEDURE — 84443 ASSAY THYROID STIM HORMONE: CPT

## 2019-04-10 PROCEDURE — 80053 COMPREHEN METABOLIC PANEL: CPT

## 2019-04-11 LAB
ALBUMIN SERPL-MCNC: 4.1 G/DL (ref 3.5–4.8)
ALBUMIN/GLOB SERPL: 1.5 {RATIO} (ref 1.2–2.2)
ALP SERPL-CCNC: 58 IU/L (ref 39–117)
ALT SERPL-CCNC: 19 IU/L (ref 0–44)
AST SERPL-CCNC: 25 IU/L (ref 0–40)
BASOPHILS # BLD AUTO: 0 X10E3/UL (ref 0–0.2)
BASOPHILS NFR BLD AUTO: 1 %
BILIRUB SERPL-MCNC: 0.4 MG/DL (ref 0–1.2)
BUN SERPL-MCNC: 18 MG/DL (ref 8–27)
BUN/CREAT SERPL: 20 (ref 10–24)
CALCIUM SERPL-MCNC: 9.4 MG/DL (ref 8.6–10.2)
CHLORIDE SERPL-SCNC: 107 MMOL/L (ref 96–106)
CHOLEST SERPL-MCNC: 161 MG/DL (ref 100–199)
CO2 SERPL-SCNC: 23 MMOL/L (ref 20–29)
CREAT SERPL-MCNC: 0.92 MG/DL (ref 0.76–1.27)
EOSINOPHIL # BLD AUTO: 0.2 X10E3/UL (ref 0–0.4)
EOSINOPHIL NFR BLD AUTO: 3 %
ERYTHROCYTE [DISTWIDTH] IN BLOOD BY AUTOMATED COUNT: 14.5 % (ref 12.3–15.4)
GLOBULIN SER CALC-MCNC: 2.8 G/DL (ref 1.5–4.5)
GLUCOSE SERPL-MCNC: 90 MG/DL (ref 65–99)
HCT VFR BLD AUTO: 47 % (ref 37.5–51)
HDLC SERPL-MCNC: 44 MG/DL
HGB BLD-MCNC: 16.1 G/DL (ref 13–17.7)
IMM GRANULOCYTES # BLD AUTO: 0 X10E3/UL (ref 0–0.1)
IMM GRANULOCYTES NFR BLD AUTO: 0 %
LDLC SERPL CALC-MCNC: 91 MG/DL (ref 0–99)
LYMPHOCYTES # BLD AUTO: 2.5 X10E3/UL (ref 0.7–3.1)
LYMPHOCYTES NFR BLD AUTO: 36 %
MCH RBC QN AUTO: 29.3 PG (ref 26.6–33)
MCHC RBC AUTO-ENTMCNC: 34.3 G/DL (ref 31.5–35.7)
MCV RBC AUTO: 86 FL (ref 79–97)
MONOCYTES # BLD AUTO: 0.4 X10E3/UL (ref 0.1–0.9)
MONOCYTES NFR BLD AUTO: 6 %
NEUTROPHILS # BLD AUTO: 3.7 X10E3/UL (ref 1.4–7)
NEUTROPHILS NFR BLD AUTO: 54 %
PLATELET # BLD AUTO: 215 X10E3/UL (ref 150–379)
POTASSIUM SERPL-SCNC: 4.4 MMOL/L (ref 3.5–5.2)
PROT SERPL-MCNC: 6.9 G/DL (ref 6–8.5)
RBC # BLD AUTO: 5.5 X10E6/UL (ref 4.14–5.8)
SODIUM SERPL-SCNC: 143 MMOL/L (ref 134–144)
TRIGL SERPL-MCNC: 130 MG/DL (ref 0–149)
TSH SERPL DL<=0.005 MIU/L-ACNC: 1.69 UIU/ML (ref 0.45–4.5)
VLDLC SERPL CALC-MCNC: 26 MG/DL (ref 5–40)
WBC # BLD AUTO: 6.9 X10E3/UL (ref 3.4–10.8)

## 2019-04-23 ENCOUNTER — OFFICE VISIT (OUTPATIENT)
Dept: INTERNAL MEDICINE CLINIC | Age: 74
End: 2019-04-23

## 2019-04-23 VITALS
RESPIRATION RATE: 18 BRPM | SYSTOLIC BLOOD PRESSURE: 122 MMHG | WEIGHT: 197.25 LBS | BODY MASS INDEX: 27.61 KG/M2 | TEMPERATURE: 97.9 F | DIASTOLIC BLOOD PRESSURE: 86 MMHG | HEART RATE: 86 BPM | HEIGHT: 71 IN

## 2019-04-23 DIAGNOSIS — R73.01 IFG (IMPAIRED FASTING GLUCOSE): ICD-10-CM

## 2019-04-23 DIAGNOSIS — I25.10 ATHEROSCLEROSIS OF NATIVE CORONARY ARTERY OF NATIVE HEART WITHOUT ANGINA PECTORIS: ICD-10-CM

## 2019-04-23 DIAGNOSIS — C61 MALIGNANT NEOPLASM OF PROSTATE (HCC): ICD-10-CM

## 2019-04-23 DIAGNOSIS — E78.2 MIXED HYPERLIPIDEMIA: Primary | ICD-10-CM

## 2019-04-23 DIAGNOSIS — I10 ESSENTIAL HYPERTENSION, BENIGN: ICD-10-CM

## 2019-04-23 LAB — HBA1C MFR BLD HPLC: 5.3 %

## 2019-04-23 NOTE — PROGRESS NOTES
HISTORY OF PRESENT ILLNESS  Teo Garcia is a 75 y.o. male. HPI Subjective:  Olivia is seen today for follow up of hypertension and other problems. 1. Hypertension, stable on current regimen. 2. Hyperlipidemia. Reviewed labs, stable. 3. Prostate cancer. Up to date with urology follow up. 4. New problem reviewed, elevated blood sugar. Rechecked a fingerstick BG and it was normal at 5.3.  5. Preventive medicine. Wellness visit in six months. Review of Systems:  Notable for him feeling great overall. He has some insomnia and has tried some different treatments. He does not feel the need for any prescription medication. Social History:  Notable for him moving full time to [place] with his wife. He had previously commuted there, but now is commuting back to [place] for work a couple times a week. Review of Systems   Constitutional: Negative for weight loss. Respiratory: Negative. Cardiovascular: Negative for chest pain, palpitations, leg swelling and PND. Musculoskeletal: Negative for myalgias. Neurological: Negative for focal weakness. Psychiatric/Behavioral: The patient has insomnia. Physical Exam   Constitutional: No distress. Neck: Carotid bruit is not present. Cardiovascular: Normal rate and regular rhythm. Exam reveals no gallop and no friction rub. No murmur heard. Pulmonary/Chest: Effort normal and breath sounds normal. No respiratory distress. Musculoskeletal: He exhibits no edema. Nursing note and vitals reviewed. ASSESSMENT and PLAN  Diagnoses and all orders for this visit:    1. Mixed hyperlipidemia  -     METABOLIC PANEL, COMPREHENSIVE  -     LIPID PANEL    2. Malignant neoplasm of prostate [facility])- See urologist as directed     3. Essential hypertension, benign- Continue current regimen of prescription and / or OTC medications     4. Atherosclerosis of native coronary artery of native heart without angina pectoris- See cardiologist as directed. 5. IFG (impaired fasting glucose)  -     HEMOGLOBIN A1C WITH EAG  -     AMB POC HEMOGLOBIN A1C

## 2019-08-21 LAB — EF %, EXTERNAL: NORMAL

## 2019-09-09 LAB — EF %, EXTERNAL: NORMAL

## 2019-10-22 ENCOUNTER — OFFICE VISIT (OUTPATIENT)
Dept: INTERNAL MEDICINE CLINIC | Age: 74
End: 2019-10-22

## 2019-10-22 VITALS
HEART RATE: 81 BPM | SYSTOLIC BLOOD PRESSURE: 132 MMHG | DIASTOLIC BLOOD PRESSURE: 74 MMHG | OXYGEN SATURATION: 96 % | WEIGHT: 200.6 LBS | TEMPERATURE: 97.6 F | HEIGHT: 71 IN | BODY MASS INDEX: 28.08 KG/M2 | RESPIRATION RATE: 19 BRPM

## 2019-10-22 DIAGNOSIS — I10 ESSENTIAL HYPERTENSION, BENIGN: ICD-10-CM

## 2019-10-22 DIAGNOSIS — C61 MALIGNANT NEOPLASM OF PROSTATE (HCC): ICD-10-CM

## 2019-10-22 DIAGNOSIS — I25.10 ATHEROSCLEROSIS OF NATIVE CORONARY ARTERY OF NATIVE HEART WITHOUT ANGINA PECTORIS: ICD-10-CM

## 2019-10-22 DIAGNOSIS — R73.01 IFG (IMPAIRED FASTING GLUCOSE): ICD-10-CM

## 2019-10-22 DIAGNOSIS — E78.2 MIXED HYPERLIPIDEMIA: Primary | ICD-10-CM

## 2019-10-22 NOTE — PROGRESS NOTES
HISTORY OF PRESENT ILLNESS  Agustina Kim is a 75 y.o. male. HPI Subjective:  Olivia is seen today for follow up of hyperlipidemia and other problems. 1. Hyperlipidemia, IFG. Cholesterol is acceptable, but he tells me his cardiologist may be adjusting his regimen. IFG appears to have resolved. 2. Abnormal calcium score. Pending cardiology follow up, his stress test was fine. He has never had any symptoms. Diagnosis was made on the basis of CT scanning. 3. Prostate cancer, up to date with specialist follow up. 4. Hypertension, fair readings, but he averages better outside of the office, about 132/74. 5. Preventive medicine. Wellness visit in six months. Current Outpatient Medications   Medication Sig    sildenafil citrate (VIAGRA) 100 mg tablet take 1 tablet by mouth daily if needed    rosuvastatin (CRESTOR) 20 mg tablet TAKE 1 TABLET EVERY DAY- new dose per cardiology    lisinopril-hydroCHLOROthiazide (PRINZIDE, ZESTORETIC) 20-12.5 mg per tablet TAKE 1/2 TABLET EVERY MORNING    temazepam (RESTORIL) 15 mg capsule Take  by mouth nightly as needed for Sleep.  cetirizine (ZYRTEC) 10 mg tablet Take  by mouth daily as needed for Allergies.  KRILL OIL PO Take  by mouth.  melatonin 10 mg cap Take  by mouth nightly.  amLODIPine (NORVASC) 5 mg tablet Take 5 mg by mouth daily.  UBIDECARENONE/VITAMIN E MIXED (COQ10  PO) Take  by mouth.  aspirin 81 mg Tab Take 1 Tab by mouth every morning. No current facility-administered medications for this visit. Review of Systems   Constitutional: Negative for weight loss. Respiratory: Negative. Cardiovascular: Negative for chest pain, palpitations, leg swelling and PND. Musculoskeletal: Negative for myalgias. Neurological: Negative for focal weakness. Physical Exam   Constitutional: No distress. Neck: Carotid bruit is not present. Cardiovascular: Normal rate and regular rhythm.  Exam reveals no gallop and no friction rub.   No murmur heard. Pulmonary/Chest: Effort normal and breath sounds normal. No respiratory distress. Musculoskeletal: He exhibits no edema. Nursing note and vitals reviewed. ASSESSMENT and PLAN  Diagnoses and all orders for this visit:    1. Mixed hyperlipidemia  -     CBC WITH AUTOMATED DIFF  -     LIPID PANEL  -     TSH 3RD GENERATION  -     METABOLIC PANEL, COMPREHENSIVE    2. Malignant neoplasm of prostate [facility])- See urologist as directed     3. Essential hypertension, benign  -     URINALYSIS W/ RFLX MICROSCOPIC    4. Atherosclerosis of native coronary artery of native heart without angina pectoris- See cardiologist as directed.      5. IFG (impaired fasting glucose)- Diet and exercise

## 2019-10-29 RX ORDER — LISINOPRIL AND HYDROCHLOROTHIAZIDE 12.5; 2 MG/1; MG/1
TABLET ORAL
Qty: 45 TAB | Refills: 3 | Status: SHIPPED | OUTPATIENT
Start: 2019-10-29 | End: 2020-06-23

## 2019-11-05 ENCOUNTER — TELEPHONE (OUTPATIENT)
Dept: INTERNAL MEDICINE CLINIC | Age: 74
End: 2019-11-05

## 2019-12-18 ENCOUNTER — OFFICE VISIT (OUTPATIENT)
Dept: INTERNAL MEDICINE CLINIC | Age: 74
End: 2019-12-18

## 2019-12-18 VITALS
HEART RATE: 86 BPM | DIASTOLIC BLOOD PRESSURE: 74 MMHG | BODY MASS INDEX: 27.69 KG/M2 | OXYGEN SATURATION: 98 % | TEMPERATURE: 97.8 F | SYSTOLIC BLOOD PRESSURE: 123 MMHG | HEIGHT: 71 IN | RESPIRATION RATE: 16 BRPM | WEIGHT: 197.8 LBS

## 2019-12-18 DIAGNOSIS — C61 MALIGNANT NEOPLASM OF PROSTATE (HCC): ICD-10-CM

## 2019-12-18 DIAGNOSIS — R73.01 IFG (IMPAIRED FASTING GLUCOSE): ICD-10-CM

## 2019-12-18 DIAGNOSIS — Z13.31 SCREENING FOR DEPRESSION: ICD-10-CM

## 2019-12-18 DIAGNOSIS — Z13.39 SCREENING FOR ALCOHOLISM: ICD-10-CM

## 2019-12-18 DIAGNOSIS — I10 ESSENTIAL HYPERTENSION, BENIGN: ICD-10-CM

## 2019-12-18 DIAGNOSIS — E78.2 MIXED HYPERLIPIDEMIA: ICD-10-CM

## 2019-12-18 DIAGNOSIS — Z00.00 MEDICARE ANNUAL WELLNESS VISIT, SUBSEQUENT: Primary | ICD-10-CM

## 2019-12-18 DIAGNOSIS — I25.10 ATHEROSCLEROSIS OF NATIVE CORONARY ARTERY OF NATIVE HEART WITHOUT ANGINA PECTORIS: ICD-10-CM

## 2019-12-18 DIAGNOSIS — Z71.89 ADVANCE CARE PLANNING: ICD-10-CM

## 2019-12-18 RX ORDER — EZETIMIBE 10 MG/1
TABLET ORAL
COMMUNITY

## 2019-12-18 NOTE — PROGRESS NOTES
Ramsey Serrano is a 75 y.o. male and presents for Annual Medicare Wellness Visit. Elevated BP in office today. Patient reports home BP this morning 123/74. Assessment of cognitive impairment: Alert and oriented x 3. Abuse Screen:    Abuse Screening Questionnaire 12/18/2019   Do you ever feel afraid of your partner? N   Are you in a relationship with someone who physically or mentally threatens you? N   Is it safe for you to go home? Y       Depression Screen:   3 most recent PHQ Screens 12/18/2019   Little interest or pleasure in doing things Not at all   Feeling down, depressed, irritable, or hopeless Not at all   Total Score PHQ 2 0       Fall Risk Assessment:    Fall Risk Assessment, last 12 mths 12/18/2019   Able to walk? Yes   Fall in past 12 months? Yes   Fall with injury? No   Number of falls in past 12 months 1   Fall Risk Score 1       Activities of Daily Living:    ADL Assessment 12/18/2019   Feeding yourself No Help Needed   Getting from bed to chair No Help Needed   Getting dressed No Help Needed   Bathing or showering No Help Needed   Walk across the room (includes cane/walker) No Help Needed   Using the telphone No Help Needed   Taking your medications No Help Needed   Preparing meals No Help Needed   Managing money (expenses/bills) No Help Needed   Moderately strenuous housework (laundry) No Help Needed   Shopping for personal items (toiletries/medicines) No Help Needed   Shopping for groceries No Help Needed   Driving No Help Needed   Climbing a flight of stairs No Help Needed   Getting to places beyond walking distances No Help Needed       Health Maintenance:  Daily Low Dose Aspirin: yes  Bone Density: NA  Glaucoma Screening: yes, UTD sees Dr. Zak Anderson every February. Immunizations:    Tetanus: up to date 2016. Influenza: up to date 2019. Shingles:  Shingrix: up to date both done in 2018   Pneumovax:  up to date 2009. Prevnar: up to date 2015.   Cancer screening:    Cervical: NA. Breast: NA.  Colon: up to date Fall 2019 q3 years due in 2022. Prostate:  managed by Urology    Advance Care Planning:   End of Life Planning: has an advanced directive - a copy HAS NOT been provided. Provided pt with \"Respecting Choices packet of Information\" no  Offered facilitator session with NN no     Medications/Allergies: Reviewed with patient  Prior to Admission medications    Medication Sig Start Date End Date Taking? Authorizing Provider   ezetimibe (ZETIA) 10 mg tablet Take  by mouth. Yes Provider, Historical   lisinopril-hydroCHLOROthiazide (PRINZIDE, ZESTORETIC) 20-12.5 mg per tablet TAKE 1/2 TABLET EVERY MORNING 10/29/19  Yes Lion Evans MD   rosuvastatin (CRESTOR) 20 mg tablet TAKE 1 TABLET EVERY DAY- new dose per cardiology 11/1/18  Yes Yodit Barrett MD   KRILL OIL PO Take  by mouth. Yes Provider, Historical   amLODIPine (NORVASC) 5 mg tablet Take 5 mg by mouth daily. 9/14/15  Yes Provider, Historical   UBIDECARENONE/VITAMIN E MIXED (COQ10  PO) Take  by mouth. Yes Provider, Historical   aspirin 81 mg Tab Take 1 Tab by mouth every morning. Yes Provider, Historical   sildenafil citrate (VIAGRA) 100 mg tablet take 1 tablet by mouth daily if needed 11/14/18   Lion Evans MD   temazepam (RESTORIL) 15 mg capsule Take  by mouth nightly as needed for Sleep. Provider, Historical   cetirizine (ZYRTEC) 10 mg tablet Take  by mouth daily as needed for Allergies. Provider, Historical   melatonin 10 mg cap Take  by mouth nightly. Provider, Historical     Allergies   Allergen Reactions    Pcn [Penicillins] Rash       PSH: Reviewed with patient  Past Surgical History:   Procedure Laterality Date    COLONOSCOPY N/A 5/25/2018    COLONOSCOPY performed by Salome Weaver MD at [facility]. [address], COLON, DIAGNOSTIC      08, 14 due 18- see my chart message 5/12/17.         SH: Reviewed with patient  Social History     Tobacco Use    Smoking status: Never Smoker    Smokeless tobacco: Never Used   Substance Use Topics    Alcohol use: Yes     Alcohol/week: 0.0 standard drinks     Comment: weekend drinks social    Drug use: No       FH: Reviewed with patient  Family History   Problem Relation Age of Onset    Heart Disease Father     Diabetes Father     Heart Disease Sister     Depression Brother          Objective:  Visit Vitals  /83 (BP 1 Location: Right arm)   Pulse 86   Temp 97.8 °F (36.6 °C)   Resp 16   Ht 5' 11\" (1.803 m)   Wt 197 lb 12.8 oz (89.7 kg)   SpO2 98%   BMI 27.59 kg/m²    Body mass index is 27.59 kg/m². Alcohol Risk Screen:   On any occasion during past 3 months, have you had more than 3 drinks (female) or 4 drinks (male) containing alcohol? No  Do you average more than 7 drinks (female) or 14 drinks (male) per week? No  Type and Amount: 1/2 glass wine a night    Tobacco Abuse:  No    Nutrition Screen:  eats a balanced diet    Hearing Loss:  Reports mild loss    Vision Loss:   Wears glasses, contact lenses, or have any other visual impairment  Wears glasses    Activities of Daily Living:  Self-care. Requires assistance with: no ADLs  Patient handle his/her own medications  yes     Current medical providers:    Patient Care Team:  Michelle Lipscomb MD as PCP - General  Michelle Lipscomb MD as PCP [facility]  Campbell Jenkins OD (Ophthalmology)  Alejandra Muller MD (Gastroenterology)  Karen Maciel MD (Cardiology)      Plan:      Orders Placed This Encounter    ezetimibe (ZETIA) 10 mg tablet       Health Maintenance   Topic Date Due    Pneumococcal 65+ years (2 of 2 - PPSV23) 10/15/2016    GLAUCOMA SCREENING Q2Y  03/02/2019    MEDICARE YEARLY EXAM  10/30/2019    COLONOSCOPY  09/25/2022    DTaP/Tdap/Td series (2 - Td) 04/23/2026    Shingrix Vaccine Age 50>  Completed    Influenza Age 5 to Adult  Completed       *Patient verbalized understanding and agreement with the plan.   A copy of the After Visit Summary with personalized health plan was given to the patient today. Physical Exam will be performed by PCP and documented under a separate Progress Note.

## 2019-12-18 NOTE — PROGRESS NOTES
HISTORY OF PRESENT ILLNESS  Vonda Lopez is a 75 y.o. male. HPI Subjective:  Kim Ortega is seen today for follow up of chronic problems and to have a wellness visit. 1. Wellness visit. See attached RN note. This is fully reviewed and discussed with Nalini. He is due for an eye exam report and advanced medical directive copy. 2. Chronic problems are reviewed. a. Hyperlipidemia. His cardiologist has added Zetia. b. Insomnia. He wonders about Restoril. This is prescribed by his former cardiologist, who is retired. His new cardiologist does not want to prescribe it. He uses it very infrequently. I reviewed potential side effects, but I think for infrequent use it would be fine to continue. c. Hypertension. Home average is much better. Current Outpatient Medications   Medication Sig    ezetimibe (ZETIA) 10 mg tablet Take  by mouth.  lisinopril-hydroCHLOROthiazide (PRINZIDE, ZESTORETIC) 20-12.5 mg per tablet TAKE 1/2 TABLET EVERY MORNING    rosuvastatin (CRESTOR) 20 mg tablet TAKE 1 TABLET EVERY DAY- new dose per cardiology    KRILL OIL PO Take  by mouth.  amLODIPine (NORVASC) 5 mg tablet Take 5 mg by mouth daily.  UBIDECARENONE/VITAMIN E MIXED (COQ10  PO) Take  by mouth.  aspirin 81 mg Tab Take 1 Tab by mouth every morning.  sildenafil citrate (VIAGRA) 100 mg tablet take 1 tablet by mouth daily if needed    temazepam (RESTORIL) 15 mg capsule Take  by mouth nightly as needed for Sleep.  cetirizine (ZYRTEC) 10 mg tablet Take  by mouth daily as needed for Allergies.  melatonin 10 mg cap Take  by mouth nightly. No current facility-administered medications for this visit. Review of Systems   Constitutional: Negative for weight loss. Respiratory: Negative. Cardiovascular: Negative for chest pain, palpitations, leg swelling and PND. Musculoskeletal: Negative for myalgias. Neurological: Negative for focal weakness.    Psychiatric/Behavioral: The patient has insomnia. Physical Exam  Vitals signs and nursing note reviewed. Constitutional:       General: He is not in acute distress. Neck:      Vascular: No carotid bruit. Cardiovascular:      Rate and Rhythm: Normal rate and regular rhythm. Heart sounds: No murmur. No friction rub. No gallop. Pulmonary:      Effort: Pulmonary effort is normal. No respiratory distress. Breath sounds: Normal breath sounds. ASSESSMENT and PLAN  Diagnoses and all orders for this visit:    1. Medicare annual wellness visit, subsequent    2. Screening for alcoholism    3. Advance care planning    4. Screening for depression    5. Mixed hyperlipidemia  -     LIPID PANEL  -     HEPATIC FUNCTION PANEL    6. Malignant neoplasm of prostate [facility])- See urologist as directed     7. Essential hypertension, benign- Continue current regimen of prescription and / or OTC medications     8. Atherosclerosis of native coronary artery of native heart without angina pectoris- See cardiologist as directed.      9. IFG (impaired fasting glucose)- Diet and exercise

## 2019-12-18 NOTE — PATIENT INSTRUCTIONS
Today you had a Medicare Wellness Visit. During this visit, we developed and/or updated your personalized health plan to prevent disease and disability based on your current health and risk factors. Please schedule an appt around this time next year so we can continue to keep you on the right path to living a healthy lifestyle. Schedule of Personalized Health Plan The best way to stay healthy is to live a healthy lifestyle. A healthy lifestyle includes regular exercise, eating a well-balanced diet, keeping a healthy weight and not smoking. Regular physical exams and screening tests are another important way to take care of yourself. Preventive exams provided by health care providers can find health problems early when treatment works best and can keep you from getting certain diseases or illnesses. Preventive services include exams, lab tests, screenings, shots, monitoring and information to help you take care of your own health. All people over 65 should have a pneumonia shot. There are 2 Pneumonia shots, given a year apart, each is usually only needed once in a lifetime unless your doctor decides differently. All people over 65 should have a yearly flu shot. People over 65 are at medium to high risk for Hepatitis B. Three shots are needed for complete protection. Talk with your provider for more details. In addition to your physical exam, some screening tests are recommended: 
 
Diabetes Mellitus screening is recommended every year. Glaucoma is an eye disease caused by high pressure in the eye. An eye exam is recommended every year. Cardiovascular screening tests that check your cholesterol and other blood fat (lipid) levels are recommended at least every five years, depending on your personal and family history risk factors.    
 
Colorectal Cancer screening tests help to find pre-cancerous polyps (growths in the colon) so they can be removed before they turn into cancer. Tests ordered for screening depend on your personal and family history risk factors. Here is a list of your current Health Maintenance items with a due date: 
Health Maintenance Topic Date Due  Pneumococcal 65+ years (2 of 2 - PPSV23) 10/15/2016  GLAUCOMA SCREENING Q2Y  03/02/2019  MEDICARE YEARLY EXAM  10/30/2019  COLONOSCOPY  09/25/2022  DTaP/Tdap/Td series (2 - Td) 04/23/2026  Shingrix Vaccine Age 50>  Completed  Influenza Age 5 to Adult  Completed

## 2019-12-26 ENCOUNTER — TELEPHONE (OUTPATIENT)
Dept: INTERNAL MEDICINE CLINIC | Age: 74
End: 2019-12-26

## 2019-12-26 DIAGNOSIS — G47.00 INSOMNIA, UNSPECIFIED TYPE: Primary | ICD-10-CM

## 2019-12-26 NOTE — TELEPHONE ENCOUNTER
[phone]    The patient was told  A medication for sleep was going to be called in for him.  It has not been called in yet

## 2019-12-29 RX ORDER — TEMAZEPAM 15 MG/1
15 CAPSULE ORAL
Qty: 30 CAP | Refills: 0 | Status: SHIPPED | OUTPATIENT
Start: 2019-12-29

## 2020-01-21 ENCOUNTER — HOSPITAL ENCOUNTER (OUTPATIENT)
Dept: LAB | Age: 75
Discharge: HOME | End: 2020-01-21
Payer: MEDICARE

## 2020-01-21 PROCEDURE — 81003 URINALYSIS AUTO W/O SCOPE: CPT

## 2020-01-21 PROCEDURE — 80053 COMPREHEN METABOLIC PANEL: CPT

## 2020-01-21 PROCEDURE — 84443 ASSAY THYROID STIM HORMONE: CPT

## 2020-01-21 PROCEDURE — 36415 COLL VENOUS BLD VENIPUNCTURE: CPT

## 2020-01-21 PROCEDURE — 85025 COMPLETE CBC W/AUTO DIFF WBC: CPT

## 2020-01-21 PROCEDURE — 80061 LIPID PANEL: CPT

## 2020-01-22 ENCOUNTER — DOCUMENTATION ONLY (OUTPATIENT)
Dept: INTERNAL MEDICINE CLINIC | Age: 75
End: 2020-01-22

## 2020-01-22 LAB
ALBUMIN SERPL-MCNC: 4.4 G/DL (ref 3.7–4.7)
ALBUMIN/GLOB SERPL: 1.8 {RATIO} (ref 1.2–2.2)
ALP SERPL-CCNC: 69 IU/L (ref 39–117)
ALT SERPL-CCNC: 18 IU/L (ref 0–44)
APPEARANCE UR: CLEAR
AST SERPL-CCNC: 22 IU/L (ref 0–40)
BASOPHILS # BLD AUTO: 0.1 X10E3/UL (ref 0–0.2)
BASOPHILS NFR BLD AUTO: 1 %
BILIRUB SERPL-MCNC: 0.5 MG/DL (ref 0–1.2)
BILIRUB UR QL STRIP: NEGATIVE
BUN SERPL-MCNC: 21 MG/DL (ref 8–27)
BUN/CREAT SERPL: 17 (ref 10–24)
CALCIUM SERPL-MCNC: 10 MG/DL (ref 8.6–10.2)
CHLORIDE SERPL-SCNC: 105 MMOL/L (ref 96–106)
CHOLEST SERPL-MCNC: 133 MG/DL (ref 100–199)
CO2 SERPL-SCNC: 26 MMOL/L (ref 20–29)
COLOR UR: YELLOW
CREAT SERPL-MCNC: 1.25 MG/DL (ref 0.76–1.27)
EOSINOPHIL # BLD AUTO: 0.1 X10E3/UL (ref 0–0.4)
EOSINOPHIL NFR BLD AUTO: 2 %
ERYTHROCYTE [DISTWIDTH] IN BLOOD BY AUTOMATED COUNT: 12.9 % (ref 11.6–15.4)
GLOBULIN SER CALC-MCNC: 2.5 G/DL (ref 1.5–4.5)
GLUCOSE SERPL-MCNC: 90 MG/DL (ref 65–99)
GLUCOSE UR QL: NEGATIVE
HCT VFR BLD AUTO: 49.1 % (ref 37.5–51)
HDLC SERPL-MCNC: 42 MG/DL
HGB BLD-MCNC: 16.3 G/DL (ref 13–17.7)
HGB UR QL STRIP: NEGATIVE
IMM GRANULOCYTES # BLD AUTO: 0 X10E3/UL (ref 0–0.1)
IMM GRANULOCYTES NFR BLD AUTO: 0 %
KETONES UR QL STRIP: NEGATIVE
LDLC SERPL CALC-MCNC: 63 MG/DL (ref 0–99)
LEUKOCYTE ESTERASE UR QL STRIP: NEGATIVE
LYMPHOCYTES # BLD AUTO: 2.4 X10E3/UL (ref 0.7–3.1)
LYMPHOCYTES NFR BLD AUTO: 32 %
MCH RBC QN AUTO: 29 PG (ref 26.6–33)
MCHC RBC AUTO-ENTMCNC: 33.2 G/DL (ref 31.5–35.7)
MCV RBC AUTO: 87 FL (ref 79–97)
MICRO URNS: NORMAL
MONOCYTES # BLD AUTO: 0.6 X10E3/UL (ref 0.1–0.9)
MONOCYTES NFR BLD AUTO: 7 %
NEUTROPHILS # BLD AUTO: 4.3 X10E3/UL (ref 1.4–7)
NEUTROPHILS NFR BLD AUTO: 58 %
NITRITE UR QL STRIP: NEGATIVE
PH UR STRIP: 5.5 [PH] (ref 5–7.5)
PLATELET # BLD AUTO: 232 X10E3/UL (ref 150–450)
POTASSIUM SERPL-SCNC: 4.9 MMOL/L (ref 3.5–5.2)
PROT SERPL-MCNC: 6.9 G/DL (ref 6–8.5)
PROT UR QL STRIP: NEGATIVE
RBC # BLD AUTO: 5.62 X10E6/UL (ref 4.14–5.8)
SODIUM SERPL-SCNC: 145 MMOL/L (ref 134–144)
SP GR UR: 1.02 (ref 1–1.03)
TRIGL SERPL-MCNC: 139 MG/DL (ref 0–149)
TSH SERPL DL<=0.005 MIU/L-ACNC: 1.62 UIU/ML (ref 0.45–4.5)
UROBILINOGEN UR STRIP-MCNC: 0.2 MG/DL (ref 0.2–1)
VLDLC SERPL CALC-MCNC: 28 MG/DL (ref 5–40)
WBC # BLD AUTO: 7.4 X10E3/UL (ref 3.4–10.8)

## 2020-03-30 PROBLEM — Z85.46 HISTORY OF PROSTATE CANCER: Status: ACTIVE | Noted: 2020-03-30

## 2020-06-18 ENCOUNTER — HOSPITAL ENCOUNTER (OUTPATIENT)
Dept: LAB | Age: 75
Discharge: HOME | End: 2020-06-18
Payer: MEDICARE

## 2020-06-18 PROCEDURE — 80076 HEPATIC FUNCTION PANEL: CPT

## 2020-06-18 PROCEDURE — 36415 COLL VENOUS BLD VENIPUNCTURE: CPT

## 2020-06-18 PROCEDURE — 80061 LIPID PANEL: CPT

## 2020-06-19 LAB
ALBUMIN SERPL-MCNC: 4.5 G/DL (ref 3.7–4.7)
ALP SERPL-CCNC: 61 IU/L (ref 39–117)
ALT SERPL-CCNC: 16 IU/L (ref 0–44)
AST SERPL-CCNC: 18 IU/L (ref 0–40)
BILIRUB DIRECT SERPL-MCNC: 0.16 MG/DL (ref 0–0.4)
BILIRUB SERPL-MCNC: 0.6 MG/DL (ref 0–1.2)
CHOLEST SERPL-MCNC: 138 MG/DL (ref 100–199)
HDLC SERPL-MCNC: 44 MG/DL
LDLC SERPL CALC-MCNC: 65 MG/DL (ref 0–99)
PROT SERPL-MCNC: 7 G/DL (ref 6–8.5)
TRIGL SERPL-MCNC: 143 MG/DL (ref 0–149)
VLDLC SERPL CALC-MCNC: 29 MG/DL (ref 5–40)

## 2020-06-23 ENCOUNTER — OFFICE VISIT (OUTPATIENT)
Dept: INTERNAL MEDICINE CLINIC | Age: 75
End: 2020-06-23

## 2020-06-23 VITALS
HEIGHT: 71 IN | SYSTOLIC BLOOD PRESSURE: 147 MMHG | TEMPERATURE: 98.4 F | WEIGHT: 192.8 LBS | BODY MASS INDEX: 26.99 KG/M2 | DIASTOLIC BLOOD PRESSURE: 81 MMHG | OXYGEN SATURATION: 97 % | RESPIRATION RATE: 20 BRPM | HEART RATE: 71 BPM

## 2020-06-23 DIAGNOSIS — E78.2 MIXED HYPERLIPIDEMIA: Primary | ICD-10-CM

## 2020-06-23 DIAGNOSIS — C61 MALIGNANT NEOPLASM OF PROSTATE (HCC): ICD-10-CM

## 2020-06-23 DIAGNOSIS — I10 ESSENTIAL HYPERTENSION, BENIGN: ICD-10-CM

## 2020-06-23 DIAGNOSIS — Z00.00 MEDICARE ANNUAL WELLNESS VISIT, SUBSEQUENT: ICD-10-CM

## 2020-06-23 DIAGNOSIS — R73.01 IFG (IMPAIRED FASTING GLUCOSE): ICD-10-CM

## 2020-06-23 DIAGNOSIS — Z13.31 SCREENING FOR DEPRESSION: ICD-10-CM

## 2020-06-23 DIAGNOSIS — I25.10 ATHEROSCLEROSIS OF NATIVE CORONARY ARTERY OF NATIVE HEART WITHOUT ANGINA PECTORIS: ICD-10-CM

## 2020-06-23 RX ORDER — LISINOPRIL AND HYDROCHLOROTHIAZIDE 12.5; 2 MG/1; MG/1
TABLET ORAL
Qty: 90 TAB | Refills: 3 | Status: SHIPPED | OUTPATIENT
Start: 2020-06-23 | End: 2021-04-23 | Stop reason: SDUPTHER

## 2020-06-23 NOTE — PROGRESS NOTES
This is the Subsequent Medicare Annual Wellness Exam, performed 12 months or more after the Initial AWV or the last Subsequent AWV I have reviewed the patient's medical history in detail and updated the computerized patient record. History Patient Active Problem List  
Diagnosis Code  Mixed hyperlipidemia E78.2  
 Essential hypertension, benign I10  Benign neoplasm of colon D12.6  Hypoactive sexual desire disorder F52.0  
 Encounter for long-term (current) use of medications Z79.899  
 Nasal congestion R09.81  
 Impotence of organic origin N52.9  Atherosclerosis of native coronary artery without angina pectoris I25.10  Advance directive discussed with patient Z70.80  
 History of prostate cancer Z85.46 Past Medical History:  
Diagnosis Date  Colon polyps  ED (erectile dysfunction)  Hypercholesterolemia  Hypertension  Malignant neoplasm of prostate ([facility] [address] 10/5/2009 Last PSA- 2.8 on 10/2016 No Urology notes in Media Past Surgical History:  
Procedure Laterality Date  COLONOSCOPY N/A 5/25/2018 COL[facility] performed by Kelsey Epstein MD at [facility] ENDOSCOPY  ENDOSCOPY, COLON, DIAGNOSTIC    
 08, 14 due 18- see my chart message 5/12/17. Current Outpatient Medications Medication Sig Dispense Refill  OTHER Restore Mineral Supplement 1 teaspoon before each meal.    
 temazepam (RESTORIL) 15 mg capsule Take 1 Cap by mouth nightly as needed for Sleep. Max Daily Amount: 15 mg. 30 Cap 0  
 ezetimibe (ZETIA) 10 mg tablet Take  by mouth.  lisinopril-hydroCHLOROthiazide (PRINZIDE, ZESTORETIC) 20-12.5 mg per tablet TAKE 1/2 TABLET EVERY MORNING 45 Tab 3  
 sildenafil citrate (VIAGRA) 100 mg tablet take 1 tablet by mouth daily if needed 4 Tab 98  
 rosuvastatin (CRESTOR) 20 mg tablet TAKE 1 TABLET EVERY DAY- new dose per cardiology 1 Tab 0  
 cetirizine (ZYRTEC) 10 mg tablet Take  by mouth daily as needed for Allergies.  KRILL OIL PO Take  by mouth.  melatonin 10 mg cap Take  by mouth nightly.  amLODIPine (NORVASC) 5 mg tablet Take 5 mg by mouth daily.  UBIDECARENONE/VITAMIN E MIXED (COQ10  PO) Take  by mouth.  aspirin 81 mg Tab Take 1 Tab by mouth every morning. Allergies Allergen Reactions  Pcn [Penicillins] Rash Family History Problem Relation Age of Onset  Heart Disease Father  Diabetes Father  Heart Disease Sister  Depression Brother Social History Tobacco Use  Smoking status: Never Smoker  Smokeless tobacco: Never Used Substance Use Topics  Alcohol use: Yes Alcohol/week: 0.0 standard drinks Comment: weekend drinks social  
 
 
Depression Risk Factor Screening:  
 
3 most recent PHQ Screens 2020 Little interest or pleasure in doing things Not at all Feeling down, depressed, irritable, or hopeless Not at all Total Score PHQ 2 0 Alcohol Risk Factor Screening (MALE > 65): Do you average more 1 drink per night or more than 7 drinks a week: {Yes/No:486708::\"No\"} In the past three months have you have had more than 4 drinks containing alcohol on one occasion: {Yes/No:810085::\"No\"} Functional Ability and Level of Safety:  
Hearing: {Desc; hearing loss:39544::\"Hearing is good. \"} Activities of Daily Living: The home contains: {AWV Home UTDOY:09099::\"EL safety equipment. \"} 
{Functional ADL's:54709::\"Patient does total self care\"} Ambulation: {Patient ambulates:25916::\"with no difficulty\"} Fall Risk: 
Fall Risk Assessment, last 12 mths 2020 Able to walk? Yes Fall in past 12 months? No  
Fall with injury? -  
Number of falls in past 12 months - Fall Risk Score -  
 
Abuse Screen: 
{Abuse Screen:::\"Patient is not abused\"} Cognitive Screening Has your family/caregiver stated any concerns about your memory: {AWV no/yes:34738::\"no\"} {Cognitive Screenin} Patient Care Team  
 Patient Care Team: 
Odilia Kong MD as PCP - General 
Odilia Kong MD as PCP - [address] [place] [address] [facility] John Urban (Ophthalmology) Eva Hernandez MD (Gastroenterology) Tre Moeller MD (Cardiology) Aelk Ho MD (Urology) Assessment/Plan Education and counseling provided: 
{Education List, choose as appropriate:87337::\"Are appropriate based on today's review and evaluation\"} Diagnoses and all orders for this visit: 
 
1. Medicare annual wellness visit, subsequent 2. Screening for depression 
-     [place] [id number] Health Maintenance Due Topic Date Due  GLAUCOMA SCREENING Q2Y  03/02/2019

## 2020-06-23 NOTE — PROGRESS NOTES
HISTORY OF PRESENT ILLNESS  Merry Headley is a 67 y.o. male. HPI Subjective:  Olivia is seen today for follow up of hyperlipidemia and other problems. 1. Prostate cancer, up to date with urology follow up. 2. CAD, clinically stable and up to date with cardiology follow up. 3. Hyperlipidemia. Reviewed labs, stable. 4. Hypertension, fair readings. We will follow for now. 5. Preventive medicine. Wellness visit in six months. Current Outpatient Medications   Medication Sig    OTHER Restore Mineral Supplement 1 teaspoon before each meal.    lisinopril-hydroCHLOROthiazide (PRINZIDE, ZESTORETIC) 20-12.5 mg per tablet TAKE 1TABLET EVERY MORNING- new directions    temazepam (RESTORIL) 15 mg capsule Take 1 Cap by mouth nightly as needed for Sleep. Max Daily Amount: 15 mg.    ezetimibe (ZETIA) 10 mg tablet Take  by mouth.  sildenafil citrate (VIAGRA) 100 mg tablet take 1 tablet by mouth daily if needed    rosuvastatin (CRESTOR) 20 mg tablet TAKE 1 TABLET EVERY DAY- new dose per cardiology    cetirizine (ZYRTEC) 10 mg tablet Take  by mouth daily as needed for Allergies.  KRILL OIL PO Take  by mouth.  melatonin 10 mg cap Take  by mouth nightly.  amLODIPine (NORVASC) 5 mg tablet Take 5 mg by mouth daily.  UBIDECARENONE/VITAMIN E MIXED (COQ10  PO) Take  by mouth.  aspirin 81 mg Tab Take 1 Tab by mouth every morning. No current facility-administered medications for this visit. Review of Systems   Constitutional: Negative for weight loss. Respiratory: Negative. Cardiovascular: Negative for chest pain, palpitations, leg swelling and PND. Musculoskeletal: Negative for myalgias. Neurological: Negative for focal weakness. Physical Exam  Vitals signs and nursing note reviewed. Constitutional:       General: He is not in acute distress. Cardiovascular:      Rate and Rhythm: Normal rate and regular rhythm. Heart sounds: No murmur. No friction rub. No gallop. Pulmonary:      Effort: Pulmonary effort is normal.      Breath sounds: Normal breath sounds. ASSESSMENT and PLAN  Diagnoses and all orders for this visit:    1. Mixed hyperlipidemia  -     METABOLIC PANEL, COMPREHENSIVE  -     CBC WITH AUTOMATED DIFF  -     LIPID PANEL  -     TSH 3RD GENERATION    2. Medicare annual wellness visit, subsequent    3. Screening for depression  -     DEPRESSION SCREEN ANNUAL    4. Malignant neoplasm of prostate [facility])- See urologist as directed     5. Essential hypertension, benign  -     lisinopril-hydroCHLOROthiazide (PRINZIDE, ZESTORETIC) 20-12.5 mg per tablet; TAKE 1TABLET EVERY MORNING- new directions    6. Atherosclerosis of native coronary artery of native heart without angina pectoris- See cardiologist as directed.      7. IFG (impaired fasting glucose)  -     URINALYSIS W/ RFLX MICROSCOPIC  -     HEMOGLOBIN A1C WITH EAG

## 2020-06-23 NOTE — PATIENT INSTRUCTIONS
Medicare Wellness Visit, Male The best way to live healthy is to have a lifestyle where you eat a well-balanced diet, exercise regularly, limit alcohol use, and quit all forms of tobacco/nicotine, if applicable. Regular preventive services are another way to keep healthy. Preventive services (vaccines, screening tests, monitoring & exams) can help personalize your care plan, which helps you manage your own care. Screening tests can find health problems at the earliest stages, when they are easiest to treat. [facility] follows the current, evidence-based guidelines published by the [facility]ov[facility] Brigitte ([facility]) when recommending preventive services for our patients. Because we follow these guidelines, sometimes recommendations change over time as research supports it. (For example, a prostate screening blood test is no longer routinely recommended for men with no symptoms). Of course, you and your doctor may decide to screen more often for some diseases, based on your risk and co-morbidities (chronic disease you are already diagnosed with). Preventive services for you include: - Medicare offers their members a free annual wellness visit, which is time for you and your primary care provider to discuss and plan for your preventive service needs. Take advantage of this benefit every year! 
-All adults over age 72 should receive the recommended pneumonia vaccines. Current USPSTF guidelines recommend a series of two vaccines for the best pneumonia protection.  
-All adults should have a flu vaccine yearly and tetanus vaccine every 10 years. 
-All adults age 48 and older should receive the shingles vaccines (series of two vaccines).       
-All adults age 38-68 who are overweight should have a diabetes screening test once every three years.  
-Other screening tests & preventive services for persons with diabetes include: an eye exam to screen for diabetic retinopathy, a kidney function test, a foot exam, and stricter control over your cholesterol.  
-Cardiovascular screening for adults with routine risk involves an electrocardiogram (ECG) at intervals determined by the provider.  
-Colorectal cancer screening should be done for adults age 54-65 with no increased risk factors for colorectal cancer. There are a number of acceptable methods of screening for this type of cancer. Each test has its own benefits and drawbacks. Discuss with your provider what is most appropriate for you during your annual wellness visit. The different tests include: colonoscopy (considered the best screening method), a fecal occult blood test, a fecal DNA test, and sigmoidoscopy. 
-All adults born between [facility] should be screened once for Hepatitis C. 
-An Abdominal Aortic Aneurysm (AAA) Screening is recommended for men age 73-68 who has ever smoked in their lifetime. Here is a list of your current Health Maintenance items (your personalized list of preventive services) with a due date: 
Health Maintenance Due Topic Date Due  Glaucoma Screening   03/02/2019

## 2020-08-11 ENCOUNTER — OFFICE VISIT (OUTPATIENT)
Dept: INTERNAL MEDICINE CLINIC | Age: 75
End: 2020-08-11
Payer: MEDICARE

## 2020-08-11 VITALS
HEIGHT: 71 IN | SYSTOLIC BLOOD PRESSURE: 131 MMHG | WEIGHT: 193.4 LBS | HEART RATE: 80 BPM | RESPIRATION RATE: 20 BRPM | TEMPERATURE: 98 F | DIASTOLIC BLOOD PRESSURE: 73 MMHG | OXYGEN SATURATION: 97 % | BODY MASS INDEX: 27.07 KG/M2

## 2020-08-11 DIAGNOSIS — M54.50 ACUTE BILATERAL LOW BACK PAIN WITHOUT SCIATICA: Primary | ICD-10-CM

## 2020-08-11 PROCEDURE — G8536 NO DOC ELDER MAL SCRN: HCPCS | Performed by: INTERNAL MEDICINE

## 2020-08-11 PROCEDURE — G8752 SYS BP LESS 140: HCPCS | Performed by: INTERNAL MEDICINE

## 2020-08-11 PROCEDURE — G8754 DIAS BP LESS 90: HCPCS | Performed by: INTERNAL MEDICINE

## 2020-08-11 PROCEDURE — G8419 CALC BMI OUT NRM PARAM NOF/U: HCPCS | Performed by: INTERNAL MEDICINE

## 2020-08-11 PROCEDURE — G8432 DEP SCR NOT DOC, RNG: HCPCS | Performed by: INTERNAL MEDICINE

## 2020-08-11 PROCEDURE — 99214 OFFICE O/P EST MOD 30 MIN: CPT | Performed by: INTERNAL MEDICINE

## 2020-08-11 PROCEDURE — G8427 DOCREV CUR MEDS BY ELIG CLIN: HCPCS | Performed by: INTERNAL MEDICINE

## 2020-08-11 PROCEDURE — 1101F PT FALLS ASSESS-DOCD LE1/YR: CPT | Performed by: INTERNAL MEDICINE

## 2020-08-11 RX ORDER — CYCLOBENZAPRINE HCL 5 MG
5 TABLET ORAL
Qty: 10 TAB | Refills: 1 | Status: SHIPPED | OUTPATIENT
Start: 2020-08-11 | End: 2020-08-21

## 2020-08-11 RX ORDER — DICLOFENAC SODIUM 75 MG/1
75 TABLET, DELAYED RELEASE ORAL
Qty: 10 TAB | Refills: 1 | Status: SHIPPED | OUTPATIENT
Start: 2020-08-11 | End: 2020-08-21

## 2020-08-11 NOTE — PATIENT INSTRUCTIONS

## 2020-08-11 NOTE — PROGRESS NOTES
HISTORY OF PRESENT ILLNESS  Elda López is a 67 y.o. male. Back Pain    The history is provided by the patient. This is a new problem. Episode onset: 7 d. The problem has not changed since onset. The pain is associated with no known injury (however recently has been moving a ton of stone about). The pain is present in the lower back, left side and right side. The quality of the pain is described as aching. The pain is at a severity of 6/10. The pain is worse during the night. Stiffness is present at night. Associated symptoms include paresthesias and tingling. Pertinent negatives include no fever, no paresis and no weakness. Associated symptoms comments: LLE, intermittent. He has tried NSAIDs (Advil) for the symptoms. The treatment provided mild relief. The patient's surgical history non-contributory   No known Dx regarding low back      Review of Systems   Constitutional: Negative for chills and fever. Musculoskeletal: Positive for back pain. Neurological: Positive for tingling, sensory change and paresthesias. Negative for weakness. Physical Exam  Musculoskeletal:      Lumbar back: He exhibits normal range of motion, no tenderness, no bony tenderness, no deformity and no spasm. Back:    Neurological:      Sensory: Sensation is intact. Motor: Motor function is intact. Coordination: Coordination is intact. Deep Tendon Reflexes:      Reflex Scores:       Patellar reflexes are 2+ on the right side and 2+ on the left side. Comments: Normal motor and sensory exam, negative straight leg raising test bilateral          ASSESSMENT and PLAN  Diagnoses and all orders for this visit:    1. Acute bilateral low back pain without sciatica  -     cyclobenzaprine (FLEXERIL) 5 mg tablet; Take 1 Tab by mouth nightly for 10 days. -     diclofenac EC (VOLTAREN) 75 mg EC tablet;  Take 1 Tab by mouth nightly for 10 days.  - Stretching- See patient instructions   - Consider xray, PT if persists, call PRN

## 2021-01-12 ENCOUNTER — OFFICE VISIT (OUTPATIENT)
Dept: INTERNAL MEDICINE CLINIC | Age: 76
End: 2021-01-12
Payer: MEDICARE

## 2021-01-12 VITALS
OXYGEN SATURATION: 97 % | WEIGHT: 193.2 LBS | TEMPERATURE: 97.3 F | HEART RATE: 77 BPM | BODY MASS INDEX: 27.05 KG/M2 | DIASTOLIC BLOOD PRESSURE: 71 MMHG | RESPIRATION RATE: 20 BRPM | HEIGHT: 71 IN | SYSTOLIC BLOOD PRESSURE: 125 MMHG

## 2021-01-12 DIAGNOSIS — I10 ESSENTIAL HYPERTENSION, BENIGN: ICD-10-CM

## 2021-01-12 DIAGNOSIS — C61 MALIGNANT NEOPLASM OF PROSTATE (HCC): ICD-10-CM

## 2021-01-12 DIAGNOSIS — R73.01 IFG (IMPAIRED FASTING GLUCOSE): ICD-10-CM

## 2021-01-12 DIAGNOSIS — I25.10 ATHEROSCLEROSIS OF NATIVE CORONARY ARTERY OF NATIVE HEART WITHOUT ANGINA PECTORIS: ICD-10-CM

## 2021-01-12 DIAGNOSIS — E78.2 MIXED HYPERLIPIDEMIA: ICD-10-CM

## 2021-01-12 DIAGNOSIS — Z00.00 MEDICARE ANNUAL WELLNESS VISIT, SUBSEQUENT: Primary | ICD-10-CM

## 2021-01-12 DIAGNOSIS — Z13.31 SCREENING FOR DEPRESSION: ICD-10-CM

## 2021-01-12 PROCEDURE — G0439 PPPS, SUBSEQ VISIT: HCPCS | Performed by: INTERNAL MEDICINE

## 2021-01-12 NOTE — PATIENT INSTRUCTIONS
Medicare Wellness Visit, Male The best way to live healthy is to have a lifestyle where you eat a well-balanced diet, exercise regularly, limit alcohol use, and quit all forms of tobacco/nicotine, if applicable. Regular preventive services are another way to keep healthy. Preventive services (vaccines, screening tests, monitoring & exams) can help personalize your care plan, which helps you manage your own care. Screening tests can find health problems at the earliest stages, when they are easiest to treat. [facility] follows the current, evidence-based guidelines published by the [facility] Yovanny Brigitte ([facility]) when recommending preventive services for our patients. Because we follow these guidelines, sometimes recommendations change over time as research supports it. (For example, a prostate screening blood test is no longer routinely recommended for men with no symptoms). Of course, you and your doctor may decide to screen more often for some diseases, based on your risk and co-morbidities (chronic disease you are already diagnosed with). Preventive services for you include: - Medicare offers their members a free annual wellness visit, which is time for you and your primary care provider to discuss and plan for your preventive service needs. Take advantage of this benefit every year! 
-All adults over age 72 should receive the recommended pneumonia vaccines. Current USPSTF guidelines recommend a series of two vaccines for the best pneumonia protection.  
-All adults should have a flu vaccine yearly and tetanus vaccine every 10 years. 
-All adults age 48 and older should receive the shingles vaccines (series of two vaccines). -All adults age 38-68 who are overweight should have a diabetes screening test once every three years. -Other screening tests & preventive services for persons with diabetes include: an eye exam to screen for diabetic retinopathy, a kidney function test, a foot exam, and stricter control over your cholesterol.  
-Cardiovascular screening for adults with routine risk involves an electrocardiogram (ECG) at intervals determined by the provider.  
-Colorectal cancer screening should be done for adults age 54-65 with no increased risk factors for colorectal cancer. There are a number of acceptable methods of screening for this type of cancer. Each test has its own benefits and drawbacks. Discuss with your provider what is most appropriate for you during your annual wellness visit. The different tests include: colonoscopy (considered the best screening method), a fecal occult blood test, a fecal DNA test, and sigmoidoscopy. 
-All adults born between [facility] should be screened once for Hepatitis C. 
-An Abdominal Aortic Aneurysm (AAA) Screening is recommended for men age 73-68 who has ever smoked in their lifetime. Here is a list of your current Health Maintenance items (your personalized list of preventive services) with a due date: 
Health Maintenance Due Topic Date Due  Glaucoma Screening   03/02/2019

## 2021-01-12 NOTE — PROGRESS NOTES
HISTORY OF PRESENT ILLNESS  Girish Rodriguez is a 67 y.o. male. HPI  Assessment. Iain Clancy is seen today for a [employer] Visit as well as for follow up of chronic problems. Preventive Medicine. See attached Wellness Visit note. He is due for labs. He is up to date with urology follow up, vaccinations and a colonoscopy. Chronic Problems Reviewed. Blood pressure is fine. He is asymptomatic with coronary artery disease and he follows up with cardiology. We will check his cholesterol but he has done well with management. Prostate cancer is being treated with active surveillance. Review of Systems. Notable for some insomnia. I advised him that it was okay to take Benadryl prn. Review of Systems   Constitutional: Negative for chills, fever and weight loss. HENT: Positive for hearing loss. Negative for sore throat. Respiratory: Negative. Cardiovascular: Negative for chest pain, palpitations, leg swelling and PND. Gastrointestinal: Negative. Genitourinary: Positive for frequency. Musculoskeletal: Negative for myalgias. Neurological: Negative for focal weakness. Psychiatric/Behavioral: The patient has insomnia. Physical Exam  Vitals signs and nursing note reviewed. Constitutional:       General: He is not in acute distress. Appearance: He is well-developed. HENT:      Head: Normocephalic and atraumatic. Right Ear: Tympanic membrane, ear canal and external ear normal.      Left Ear: Tympanic membrane, ear canal and external ear normal.   Eyes:      General:         Right eye: No discharge. Left eye: No discharge. Pupils: Pupils are equal, round, and reactive to light. Neck:      Musculoskeletal: Normal range of motion and neck supple. Thyroid: No thyromegaly. Vascular: No carotid bruit. Cardiovascular:      Rate and Rhythm: Normal rate and regular rhythm. Heart sounds: Normal heart sounds. No murmur. No friction rub. No gallop. Pulmonary:      Effort: Pulmonary effort is normal. No respiratory distress. Breath sounds: Normal breath sounds. No wheezing or rales. Abdominal:      General: Bowel sounds are normal. There is no distension. Palpations: Abdomen is soft. There is no mass. Tenderness: There is no abdominal tenderness. There is no guarding or rebound. Musculoskeletal: Normal range of motion. General: No tenderness. Lymphadenopathy:      Cervical: No cervical adenopathy. Skin:     General: Skin is warm and dry. Findings: No rash. Neurological:      Mental Status: He is alert and oriented to person, place, and time. Deep Tendon Reflexes: Reflexes are normal and symmetric. Psychiatric:         Behavior: Behavior normal.         ASSESSMENT and PLAN  Diagnoses and all orders for this visit:    1. Medicare annual wellness visit, subsequent    2. Screening for depression  -     DEPRESSION SCREEN ANNUAL    3. Mixed hyperlipidemia    4. Malignant neoplasm of prostate ([facility] [place] [address]    5. Essential hypertension, benign    6. Atherosclerosis of native coronary artery of native heart without angina pectoris    7.  IFG (impaired fasting glucose)

## 2021-01-12 NOTE — PROGRESS NOTES
This is the Subsequent Medicare Annual Wellness Exam, performed 12 months or more after the Initial AWV or the last Subsequent AWV    I have reviewed the patient's medical history in detail and updated the computerized patient record. Depression Risk Factor Screening:     3 most recent PHQ Screens 1/12/2021   Little interest or pleasure in doing things Not at all   Feeling down, depressed, irritable, or hopeless Not at all   Total Score PHQ 2 0       Alcohol Risk Screen    Do you average more than 1 drink per night or more than 7 drinks a week: Yes- 1 to 2 per d    In the past three months have you have had more than 4 drinks containing alcohol on one occasion: No        Functional Ability and Level of Safety:    Hearing: loss is mild      Activities of Daily Living: The home contains: no safety equipment. Patient does total self care      Ambulation: with no difficulty     Fall Risk:  Fall Risk Assessment, last 12 mths 6/23/2020   Able to walk? Yes   Fall in past 12 months? No   Number of falls in past 12 months -   Fall with injury? -      Abuse Screen:  Patient is not abused       Cognitive Screening    Has your family/caregiver stated any concerns about your memory: no         Assessment/Plan   Education and counseling provided:  Are appropriate based on today's review and evaluation    Diagnoses and all orders for this visit:    1. Medicare annual wellness visit, subsequent    2.  Screening for depression  -     [place] Maintenance Due     Health Maintenance Due   Topic Date Due    GLAUCOMA SCREENING Q2Y  03/02/2019       Patient Care Team   Patient Care Team:  Baljeet Landa MD as PCP - Jesus Davis MD as PCP - [facility] Empaneled Provider  Funmilayo Jama OD (Ophthalmology)  Tex Casanova MD (Gastroenterology)  Marvin Floyd MD (Cardiology)  Ravi Watson MD (Urology)    History     Patient Active Problem List   Diagnosis Code    Mixed hyperlipidemia E78.2    Essential hypertension, benign I10    Benign neoplasm of colon D12.6    Hypoactive sexual desire disorder F52.0    Encounter for long-term (current) use of medications Z79.899    Nasal congestion R09.81    Impotence of organic origin N52.9    Atherosclerosis of native coronary artery without angina pectoris I25.10    Advance directive discussed with patient Z70.80    History of prostate cancer Z85.46     Past Medical History:   Diagnosis Date    Colon polyps     ED (erectile dysfunction)     Hypercholesterolemia     Hypertension     Malignant neoplasm of prostate ([facility] [address] 10/5/2009    Last PSA- 2.8 on 10/2016 No Urology notes in Media      Past Surgical History:   Procedure Laterality Date    COLONOSCOPY N/A 5/25/2018    COLONOSCOPY performed by Clarisa Cervantes MD at [facility]. [address], COLON, DIAGNOSTIC      08, 14 due 18- see my chart message 5/12/17. Current Outpatient Medications   Medication Sig Dispense Refill    OTHER Restore Mineral Supplement 1 teaspoon before each meal.      lisinopril-hydroCHLOROthiazide (PRINZIDE, ZESTORETIC) 20-12.5 mg per tablet TAKE 1TABLET EVERY MORNING- new directions 90 Tab 3    temazepam (RESTORIL) 15 mg capsule Take 1 Cap by mouth nightly as needed for Sleep. Max Daily Amount: 15 mg. 30 Cap 0    ezetimibe (ZETIA) 10 mg tablet Take  by mouth.  sildenafil citrate (VIAGRA) 100 mg tablet take 1 tablet by mouth daily if needed 4 Tab 98    rosuvastatin (CRESTOR) 20 mg tablet TAKE 1 TABLET EVERY DAY- new dose per cardiology 1 Tab 0    cetirizine (ZYRTEC) 10 mg tablet Take  by mouth daily as needed for Allergies.  KRILL OIL PO Take 500 mg by mouth daily.  melatonin 10 mg cap Take  by mouth nightly as needed.  amLODIPine (NORVASC) 5 mg tablet Take 5 mg by mouth daily.  UBIDECARENONE/VITAMIN E MIXED (COQ10  PO) Take  by mouth.  aspirin 81 mg Tab Take 1 Tab by mouth every morning. Allergies   Allergen Reactions    Pcn [Penicillins] Rash       Family History   Problem Relation Age of Onset    Heart Disease Father     Diabetes Father     Heart Disease Sister     Depression Brother      Social History     Tobacco Use    Smoking status: Never Smoker    Smokeless tobacco: Never Used   Substance Use Topics    Alcohol use:  Yes     Alcohol/week: 0.0 standard drinks     Comment: weekend drinks social

## 2021-01-13 LAB
ALBUMIN SERPL-MCNC: 4.3 G/DL (ref 3.5–5)
ALBUMIN/GLOB SERPL: 1.3 {RATIO} (ref 1.1–2.2)
ALP SERPL-CCNC: 70 U/L (ref 45–117)
ALT SERPL-CCNC: 29 U/L (ref 12–78)
ANION GAP SERPL CALC-SCNC: 4 MMOL/L (ref 5–15)
APPEARANCE UR: CLEAR
AST SERPL-CCNC: 24 U/L (ref 15–37)
BASOPHILS # BLD: 0.1 K/UL (ref 0–0.1)
BASOPHILS NFR BLD: 1 % (ref 0–1)
BILIRUB SERPL-MCNC: 0.8 MG/DL (ref 0.2–1)
BILIRUB UR QL: NEGATIVE
BUN SERPL-MCNC: 24 MG/DL (ref 6–20)
BUN/CREAT SERPL: 21 (ref 12–20)
CALCIUM SERPL-MCNC: 9.5 MG/DL (ref 8.5–10.1)
CHLORIDE SERPL-SCNC: 107 MMOL/L (ref 97–108)
CHOLEST SERPL-MCNC: 151 MG/DL
CO2 SERPL-SCNC: 30 MMOL/L (ref 21–32)
COLOR UR: ABNORMAL
CREAT SERPL-MCNC: 1.16 MG/DL (ref 0.7–1.3)
DIFFERENTIAL METHOD BLD: NORMAL
EOSINOPHIL # BLD: 0.1 K/UL (ref 0–0.4)
EOSINOPHIL NFR BLD: 1 % (ref 0–7)
ERYTHROCYTE [DISTWIDTH] IN BLOOD BY AUTOMATED COUNT: 13.1 % (ref 11.5–14.5)
EST. AVERAGE GLUCOSE BLD GHB EST-MCNC: 114 MG/DL
GLOBULIN SER CALC-MCNC: 3.3 G/DL (ref 2–4)
GLUCOSE SERPL-MCNC: 86 MG/DL (ref 65–100)
GLUCOSE UR STRIP.AUTO-MCNC: NEGATIVE MG/DL
HBA1C MFR BLD: 5.6 % (ref 4–5.6)
HCT VFR BLD AUTO: 48.1 % (ref 36.6–50.3)
HDLC SERPL-MCNC: 45 MG/DL
HDLC SERPL: 3.4 {RATIO} (ref 0–5)
HGB BLD-MCNC: 16.2 G/DL (ref 12.1–17)
HGB UR QL STRIP: NEGATIVE
IMM GRANULOCYTES # BLD AUTO: 0 K/UL (ref 0–0.04)
IMM GRANULOCYTES NFR BLD AUTO: 0 % (ref 0–0.5)
KETONES UR QL STRIP.AUTO: 15 MG/DL
LDLC SERPL CALC-MCNC: 80 MG/DL (ref 0–100)
LEUKOCYTE ESTERASE UR QL STRIP.AUTO: NEGATIVE
LIPID PROFILE,FLP: NORMAL
LYMPHOCYTES # BLD: 1.9 K/UL (ref 0.8–3.5)
LYMPHOCYTES NFR BLD: 28 % (ref 12–49)
MCH RBC QN AUTO: 29.4 PG (ref 26–34)
MCHC RBC AUTO-ENTMCNC: 33.7 G/DL (ref 30–36.5)
MCV RBC AUTO: 87.3 FL (ref 80–99)
MONOCYTES # BLD: 0.6 K/UL (ref 0–1)
MONOCYTES NFR BLD: 8 % (ref 5–13)
NEUTS SEG # BLD: 4.4 K/UL (ref 1.8–8)
NEUTS SEG NFR BLD: 62 % (ref 32–75)
NITRITE UR QL STRIP.AUTO: NEGATIVE
NRBC # BLD: 0 K/UL (ref 0–0.01)
NRBC BLD-RTO: 0 PER 100 WBC
PH UR STRIP: 5.5 [PH] (ref 5–8)
PLATELET # BLD AUTO: 229 K/UL (ref 150–400)
PMV BLD AUTO: 10.1 FL (ref 8.9–12.9)
POTASSIUM SERPL-SCNC: 4.3 MMOL/L (ref 3.5–5.1)
PROT SERPL-MCNC: 7.6 G/DL (ref 6.4–8.2)
PROT UR STRIP-MCNC: NEGATIVE MG/DL
RBC # BLD AUTO: 5.51 M/UL (ref 4.1–5.7)
SODIUM SERPL-SCNC: 141 MMOL/L (ref 136–145)
SP GR UR REFRACTOMETRY: 1.03 (ref 1–1.03)
TRIGL SERPL-MCNC: 130 MG/DL (ref ?–150)
TSH SERPL DL<=0.05 MIU/L-ACNC: 1.19 UIU/ML (ref 0.36–3.74)
UR CULT HOLD, URHOLD: NORMAL
UROBILINOGEN UR QL STRIP.AUTO: 0.2 EU/DL (ref 0.2–1)
VLDLC SERPL CALC-MCNC: 26 MG/DL
WBC # BLD AUTO: 7 K/UL (ref 4.1–11.1)

## 2021-01-27 ENCOUNTER — IMMUNIZATION (OUTPATIENT)
Dept: INTERNAL MEDICINE CLINIC | Age: 76
End: 2021-01-27
Payer: MEDICARE

## 2021-01-27 DIAGNOSIS — Z23 ENCOUNTER FOR IMMUNIZATION: Primary | ICD-10-CM

## 2021-01-27 PROCEDURE — 0011A PR IMM ADMN SARSCOV2 100 MCG/0.5 ML 1ST DOSE: CPT | Performed by: FAMILY MEDICINE

## 2021-01-27 PROCEDURE — 91301 COVID-19, MRNA, LNP-S, PF, 100MCG/0.5ML DOSE(MODERNA): CPT | Performed by: FAMILY MEDICINE

## 2021-02-24 ENCOUNTER — IMMUNIZATION (OUTPATIENT)
Dept: INTERNAL MEDICINE CLINIC | Age: 76
End: 2021-02-24
Payer: MEDICARE

## 2021-02-24 DIAGNOSIS — Z23 ENCOUNTER FOR IMMUNIZATION: Primary | ICD-10-CM

## 2021-02-24 PROCEDURE — 0012A COVID-19, MRNA, LNP-S, PF, 100MCG/0.5ML DOSE(MODERNA): CPT | Performed by: FAMILY MEDICINE

## 2021-02-24 PROCEDURE — 91301 COVID-19, MRNA, LNP-S, PF, 100MCG/0.5ML DOSE(MODERNA): CPT | Performed by: FAMILY MEDICINE

## 2021-04-23 DIAGNOSIS — I10 ESSENTIAL HYPERTENSION, BENIGN: ICD-10-CM

## 2021-04-23 RX ORDER — LISINOPRIL AND HYDROCHLOROTHIAZIDE 12.5; 2 MG/1; MG/1
TABLET ORAL
Qty: 90 TAB | Refills: 3 | Status: SHIPPED | OUTPATIENT
Start: 2021-04-23 | End: 2022-06-09 | Stop reason: SDUPTHER

## 2021-04-23 NOTE — TELEPHONE ENCOUNTER
Requested Prescriptions     Pending Prescriptions Disp Refills    lisinopril-hydroCHLOROthiazide (PRINZIDE, ZESTORETIC) 20-12.5 mg per tablet 90 Tab 3     Sig: TAKE 1TABLET EVERY MORNING- new directions                 [address], [address] [place] [address]

## 2021-07-28 ENCOUNTER — TELEPHONE (OUTPATIENT)
Dept: INTERNAL MEDICINE CLINIC | Age: 76
End: 2021-07-28

## 2021-07-28 DIAGNOSIS — I10 ESSENTIAL HYPERTENSION, BENIGN: Primary | ICD-10-CM

## 2021-07-28 DIAGNOSIS — E78.2 MIXED HYPERLIPIDEMIA: ICD-10-CM

## 2021-07-28 DIAGNOSIS — R73.01 IFG (IMPAIRED FASTING GLUCOSE): ICD-10-CM

## 2021-07-28 NOTE — TELEPHONE ENCOUNTER
Spoke with pt - advised him his lab slip ready to  at . He does  Need to fast. He will come in the morning to have labs done.

## 2021-07-28 NOTE — TELEPHONE ENCOUNTER
----- Message from Chris Belcher sent at 7/28/2021 11:39 AM EDT -----  Regarding: Non-Urgent Medical Question  Contact: [phone]  Will I need to fast for tomorrow and if so, my I have that done in the morning ? I assume I will need paperwork.  Thanks chandra

## 2021-07-29 ENCOUNTER — OFFICE VISIT (OUTPATIENT)
Dept: INTERNAL MEDICINE CLINIC | Age: 76
End: 2021-07-29
Payer: MEDICARE

## 2021-07-29 VITALS
HEIGHT: 71 IN | RESPIRATION RATE: 16 BRPM | HEART RATE: 72 BPM | WEIGHT: 199 LBS | TEMPERATURE: 97.9 F | BODY MASS INDEX: 27.86 KG/M2 | DIASTOLIC BLOOD PRESSURE: 65 MMHG | OXYGEN SATURATION: 99 % | SYSTOLIC BLOOD PRESSURE: 99 MMHG

## 2021-07-29 DIAGNOSIS — I10 ESSENTIAL HYPERTENSION, BENIGN: Primary | ICD-10-CM

## 2021-07-29 DIAGNOSIS — I25.10 ATHEROSCLEROSIS OF NATIVE CORONARY ARTERY OF NATIVE HEART WITHOUT ANGINA PECTORIS: ICD-10-CM

## 2021-07-29 DIAGNOSIS — C61 MALIGNANT NEOPLASM OF PROSTATE (HCC): ICD-10-CM

## 2021-07-29 DIAGNOSIS — R73.01 IFG (IMPAIRED FASTING GLUCOSE): ICD-10-CM

## 2021-07-29 DIAGNOSIS — E78.2 MIXED HYPERLIPIDEMIA: ICD-10-CM

## 2021-07-29 PROCEDURE — G8427 DOCREV CUR MEDS BY ELIG CLIN: HCPCS | Performed by: INTERNAL MEDICINE

## 2021-07-29 PROCEDURE — 1101F PT FALLS ASSESS-DOCD LE1/YR: CPT | Performed by: INTERNAL MEDICINE

## 2021-07-29 PROCEDURE — G8419 CALC BMI OUT NRM PARAM NOF/U: HCPCS | Performed by: INTERNAL MEDICINE

## 2021-07-29 PROCEDURE — G8510 SCR DEP NEG, NO PLAN REQD: HCPCS | Performed by: INTERNAL MEDICINE

## 2021-07-29 PROCEDURE — G0463 HOSPITAL OUTPT CLINIC VISIT: HCPCS | Performed by: INTERNAL MEDICINE

## 2021-07-29 PROCEDURE — G8536 NO DOC ELDER MAL SCRN: HCPCS | Performed by: INTERNAL MEDICINE

## 2021-07-29 PROCEDURE — 99214 OFFICE O/P EST MOD 30 MIN: CPT | Performed by: INTERNAL MEDICINE

## 2021-07-29 PROCEDURE — G8754 DIAS BP LESS 90: HCPCS | Performed by: INTERNAL MEDICINE

## 2021-07-29 PROCEDURE — G8752 SYS BP LESS 140: HCPCS | Performed by: INTERNAL MEDICINE

## 2021-07-29 NOTE — PROGRESS NOTES
Verified name and birth date for privacy precautions. Chart reviewed in preparation for today's visit. Chief Complaint   Patient presents with    Hypertension          Health Maintenance Due   Topic    Hepatitis C Screening          Wt Readings from Last 3 Encounters:   07/29/21 199 lb (90.3 kg)   01/12/21 193 lb 3.2 oz (87.6 kg)   08/11/20 193 lb 6.4 oz (87.7 kg)     Temp Readings from Last 3 Encounters:   07/29/21 97.9 °F (36.6 °C) (Temporal)   01/12/21 97.3 °F (36.3 °C) (Temporal)   08/11/20 98 °F (36.7 °C) (Temporal)     BP Readings from Last 3 Encounters:   07/29/21 99/65   01/12/21 125/71   08/11/20 131/73     Pulse Readings from Last 3 Encounters:   07/29/21 72   01/12/21 77   08/11/20 80         Learning Assessment:  :     Learning Assessment 4/10/2018 4/9/2015 4/9/2014   PRIMARY LEARNER Patient Patient Patient   HIGHEST LEVEL OF EDUCATION - PRIMARY LEARNER  - 4 YEARS OF COLLEGE 4 YEARS OF COLLEGE   BARRIERS PRIMARY LEARNER - NONE NONE   CO-LEARNER CAREGIVER - No No   PRIMARY LANGUAGE ENGLISH ENGLISH ENGLISH   LEARNER PREFERENCE PRIMARY READING DEMONSTRATION DEMONSTRATION     PICTURES - -     DEMONSTRATION - -   ANSWERED BY patient self patient   RELATIONSHIP SELF SELF SELF       Depression Screening:  :     3 most recent PHQ Screens 7/29/2021   Little interest or pleasure in doing things Not at all   Feeling down, depressed, irritable, or hopeless Not at all   Total Score PHQ 2 0       Fall Risk Assessment:  :     Fall Risk Assessment, last 12 mths 7/29/2021   Able to walk? Yes   Fall in past 12 months? 0   Do you feel unsteady? 0   Are you worried about falling 0   Number of falls in past 12 months -   Fall with injury? -       Abuse Screening:  :     Abuse Screening Questionnaire 7/29/2021 12/18/2019 10/5/2017   Do you ever feel afraid of your partner? N N N   Are you in a relationship with someone who physically or mentally threatens you? N N N   Is it safe for you to go home?  Janes Tellez

## 2021-07-29 NOTE — PROGRESS NOTES
HISTORY OF PRESENT ILLNESS  Joanne Covington is a 67 y.o. male. HPI  ASSESSMENT  Olivia is seen today at follow up for cholesterol management and others     1. Hypertension- stable, Continue current regimen of prescription and / or OTC medications   2. CAD, clinically stable, Continue other medications in addition to current changes   3. Hyperlipidemia- Reviewed lab, will Continue current regimen of prescription and / or OTC medications   4. Prostate cancer- plans change of urologist   5. Wellness visit- 6 mos    Review of Systems   Constitutional: Negative for weight loss. Respiratory: Negative. Cardiovascular: Negative for chest pain, palpitations, leg swelling and PND. Musculoskeletal: Negative for myalgias. Neurological: Negative for focal weakness. Physical Exam  Vitals and nursing note reviewed. Constitutional:       General: He is not in acute distress. Neck:      Vascular: No carotid bruit. Cardiovascular:      Rate and Rhythm: Normal rate and regular rhythm. Heart sounds: No murmur heard. No friction rub. No gallop. Pulmonary:      Effort: Pulmonary effort is normal. No respiratory distress. Breath sounds: Normal breath sounds. ASSESSMENT and PLAN  Diagnoses and all orders for this visit:    1. Essential hypertension, benign    2. Mixed hyperlipidemia    3. Malignant neoplasm of prostate ([facility] [address]    4. Atherosclerosis of native coronary artery of native heart without angina pectoris    5.  IFG (impaired fasting glucose)

## 2021-09-08 LAB — PSA, EXTERNAL: 4.22

## 2021-10-05 ENCOUNTER — CLINICAL SUPPORT (OUTPATIENT)
Dept: INTERNAL MEDICINE CLINIC | Age: 76
End: 2021-10-05
Payer: MEDICARE

## 2021-10-05 VITALS — TEMPERATURE: 98.7 F

## 2021-10-05 DIAGNOSIS — Z23 NEEDS FLU SHOT: Primary | ICD-10-CM

## 2021-10-05 PROCEDURE — 90694 VACC AIIV4 NO PRSRV 0.5ML IM: CPT | Performed by: INTERNAL MEDICINE

## 2021-10-05 NOTE — PROGRESS NOTES
Elmer Zheng is a 67 y.o. male who presents for routine immunizations. Prior to vaccine administration: Consent was obtained. Risks and adverse reactions were discussed. The patient was provided the VIS and they were given an opportunity to ask questions; all questions were addressed. He denies any symptoms, reactions or allergies that would exclude them from being immunized today. There were no adverse reactions observed post vaccination. Patient was advised to seek medical or call the office with any questions or concerns post vaccination. Patient verbalized understanding.     Joe Gutierrez

## 2022-03-18 PROBLEM — Z85.46 HISTORY OF PROSTATE CANCER: Status: ACTIVE | Noted: 2020-03-30

## 2022-06-08 DIAGNOSIS — I10 ESSENTIAL HYPERTENSION, BENIGN: ICD-10-CM

## 2022-06-09 ENCOUNTER — OFFICE VISIT (OUTPATIENT)
Dept: INTERNAL MEDICINE CLINIC | Age: 77
End: 2022-06-09
Payer: MEDICARE

## 2022-06-09 VITALS
RESPIRATION RATE: 20 BRPM | OXYGEN SATURATION: 98 % | WEIGHT: 193.2 LBS | DIASTOLIC BLOOD PRESSURE: 76 MMHG | HEIGHT: 71 IN | TEMPERATURE: 98.6 F | HEART RATE: 98 BPM | BODY MASS INDEX: 27.05 KG/M2 | SYSTOLIC BLOOD PRESSURE: 139 MMHG

## 2022-06-09 DIAGNOSIS — C61 MALIGNANT NEOPLASM OF PROSTATE (HCC): ICD-10-CM

## 2022-06-09 DIAGNOSIS — Z00.00 MEDICARE ANNUAL WELLNESS VISIT, SUBSEQUENT: Primary | ICD-10-CM

## 2022-06-09 DIAGNOSIS — Z11.59 ENCOUNTER FOR HEPATITIS C SCREENING TEST FOR LOW RISK PATIENT: ICD-10-CM

## 2022-06-09 DIAGNOSIS — E78.2 MIXED HYPERLIPIDEMIA: ICD-10-CM

## 2022-06-09 DIAGNOSIS — I10 ESSENTIAL HYPERTENSION, BENIGN: ICD-10-CM

## 2022-06-09 DIAGNOSIS — Z79.899 ENCOUNTER FOR LONG-TERM (CURRENT) USE OF MEDICATIONS: ICD-10-CM

## 2022-06-09 DIAGNOSIS — Z12.5 SCREENING FOR PROSTATE CANCER: ICD-10-CM

## 2022-06-09 PROCEDURE — G8510 SCR DEP NEG, NO PLAN REQD: HCPCS | Performed by: INTERNAL MEDICINE

## 2022-06-09 PROCEDURE — G8752 SYS BP LESS 140: HCPCS | Performed by: INTERNAL MEDICINE

## 2022-06-09 PROCEDURE — G8754 DIAS BP LESS 90: HCPCS | Performed by: INTERNAL MEDICINE

## 2022-06-09 PROCEDURE — 1101F PT FALLS ASSESS-DOCD LE1/YR: CPT | Performed by: INTERNAL MEDICINE

## 2022-06-09 PROCEDURE — G8427 DOCREV CUR MEDS BY ELIG CLIN: HCPCS | Performed by: INTERNAL MEDICINE

## 2022-06-09 PROCEDURE — G0439 PPPS, SUBSEQ VISIT: HCPCS | Performed by: INTERNAL MEDICINE

## 2022-06-09 PROCEDURE — G8536 NO DOC ELDER MAL SCRN: HCPCS | Performed by: INTERNAL MEDICINE

## 2022-06-09 PROCEDURE — G8417 CALC BMI ABV UP PARAM F/U: HCPCS | Performed by: INTERNAL MEDICINE

## 2022-06-09 RX ORDER — LISINOPRIL AND HYDROCHLOROTHIAZIDE 12.5; 2 MG/1; MG/1
TABLET ORAL
Qty: 90 TABLET | Refills: 3 | Status: SHIPPED | OUTPATIENT
Start: 2022-06-09

## 2022-06-09 NOTE — PROGRESS NOTES
This is the Subsequent Medicare Annual Wellness Exam, performed 12 months or more after the Initial AWV or the last Subsequent AWV    I have reviewed the patient's medical history in detail and updated the computerized patient record. Assessment/Plan   Education and counseling provided:  Are appropriate based on today's review and evaluation    1. Medicare annual wellness visit, subsequent  2. Essential hypertension, benign  3. Mixed hyperlipidemia  4. Encounter for long-term (current) use of medications  5. Screening for prostate cancer       Depression Risk Factor Screening     3 most recent PHQ Screens 6/9/2022   Little interest or pleasure in doing things Not at all   Feeling down, depressed, irritable, or hopeless Not at all   Total Score PHQ 2 0       Alcohol & Drug Abuse Risk Screen    Do you average more than 1 drink per night or more than 7 drinks a week: No    In the past three months have you have had more than 4 drinks containing alcohol on one occasion: No          Functional Ability and Level of Safety    Hearing: Hearing is good. Activities of Daily Living: The home contains: no safety equipment. Patient does total self care      Ambulation: with no difficulty     Fall Risk:  Fall Risk Assessment, last 12 mths 6/9/2022   Able to walk? Yes   Fall in past 12 months? 0   Do you feel unsteady? 0   Are you worried about falling 0   Number of falls in past 12 months -   Fall with injury?  -      Abuse Screen:  Patient is not abused       Cognitive Screening    Has your family/caregiver stated any concerns about your memory: no         Health Maintenance Due     Health Maintenance Due   Topic Date Due    Hepatitis C Screening  Never done    COVID-19 Vaccine (3 - Booster for Mary [place] series) 07/24/2021       Patient Care Team   Patient Care Team:  Daija Fletcher MD as PCP - General  Daija Fletcher MD as PCP - [facility] Empaneled Provider  Larissa Silva OD (Ophthalmology)  Dash Cano MD (Gastroenterology)  Ender Slaughter MD (Cardiovascular Disease Physician)  Zak Rojas MD (Urology)    History     Patient Active Problem List   Diagnosis Code    Mixed hyperlipidemia E78.2    Essential hypertension, benign I10    Benign neoplasm of colon D12.6    Hypoactive sexual desire disorder 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    Encounter for long-term (current) use of medications Z79.899    Nasal congestion R09.81    Impotence of organic origin N52.9    Atherosclerosis of native coronary artery without angina pectoris I25.10    Advance directive discussed with patient Z70.80    History of prostate cancer Z85.46     Past Medical History:   Diagnosis Date    Colon polyps     ED (erectile dysfunction)     Hypercholesterolemia     Hypertension     Malignant neoplasm of prostate ([facility] [address] 10/5/2009    Last PSA- 2.8 on 10/2016 No Urology notes in Media      Past Surgical History:   Procedure Laterality Date    COLONOSCOPY N/A 5/25/2018    COLONOSCOPY performed by May Burns MD at [facility]. [address], COLON, DIAGNOSTIC      08, 14 due 18- see my chart message 5/12/17. Current Outpatient Medications   Medication Sig Dispense Refill    lisinopril-hydroCHLOROthiazide (PRINZIDE, ZESTORETIC) 20-12.5 mg per tablet TAKE 1TABLET EVERY MORNING- NEW DIRECTIONS 90 Tablet 3    OTHER Restore Mineral Supplement 1 teaspoon before each meal.      temazepam (RESTORIL) 15 mg capsule Take 1 Cap by mouth nightly as needed for Sleep. Max Daily Amount: 15 mg. 30 Cap 0    ezetimibe (ZETIA) 10 mg tablet Take  by mouth.  sildenafil citrate (VIAGRA) 100 mg tablet take 1 tablet by mouth daily if needed 4 Tab 98    rosuvastatin (CRESTOR) 20 mg tablet TAKE 1 TABLET EVERY DAY- new dose per cardiology 1 Tab 0    cetirizine (ZYRTEC) 10 mg tablet Take  by mouth daily as needed for Allergies.  melatonin 10 mg cap Take  by mouth nightly as needed.       amLODIPine (NORVASC) 5 mg tablet Take 5 mg by mouth daily.  UBIDECARENONE/VITAMIN E MIXED (COQ10  PO) Take  by mouth.  aspirin 81 mg Tab Take 1 Tab by mouth every morning. Allergies   Allergen Reactions    Pcn [Penicillins] Rash       Family History   Problem Relation Age of Onset    Heart Disease Father     Diabetes Father     Heart Disease Sister     Depression Brother      Social History     Tobacco Use    Smoking status: Never Smoker    Smokeless tobacco: Never Used   Substance Use Topics    Alcohol use:  Yes     Alcohol/week: 0.0 standard drinks     Comment: weekend drinks social         David Martin MD

## 2022-06-09 NOTE — PATIENT INSTRUCTIONS
Medicare Wellness Visit, Male    The best way to live healthy is to have a lifestyle where you eat a well-balanced diet, exercise regularly, limit alcohol use, and quit all forms of tobacco/nicotine, if applicable. Regular preventive services are another way to keep healthy. Preventive services (vaccines, screening tests, monitoring & exams) can help personalize your care plan, which helps you manage your own care. Screening tests can find health problems at the earliest stages, when they are easiest to treat. [facility] follows the current, evidence-based guidelines published by the [facility] Yovanny Brigitte ([facility]) when recommending preventive services for our patients. Because we follow these guidelines, sometimes recommendations change over time as research supports it. (For example, a prostate screening blood test is no longer routinely recommended for men with no symptoms). Of course, you and your doctor may decide to screen more often for some diseases, based on your risk and co-morbidities (chronic disease you are already diagnosed with). Preventive services for you include:  - Medicare offers their members a free annual wellness visit, which is time for you and your primary care provider to discuss and plan for your preventive service needs. Take advantage of this benefit every year!  -All adults over age 72 should receive the recommended pneumonia vaccines. Current USPSTF guidelines recommend a series of two vaccines for the best pneumonia protection.   -All adults should have a flu vaccine yearly and tetanus vaccine every 10 years.  -All adults age 48 and older should receive the shingles vaccines (series of two vaccines).        -All adults age 38-68 who are overweight should have a diabetes screening test once every three years.   -Other screening tests & preventive services for persons with diabetes include: an eye exam to screen for diabetic retinopathy, a kidney function test, a foot exam, and stricter control over your cholesterol.   -Cardiovascular screening for adults with routine risk involves an electrocardiogram (ECG) at intervals determined by the provider.   -Colorectal cancer screening should be done for adults age 54-65 with no increased risk factors for colorectal cancer. There are a number of acceptable methods of screening for this type of cancer. Each test has its own benefits and drawbacks. Discuss with your provider what is most appropriate for you during your annual wellness visit. The different tests include: colonoscopy (considered the best screening method), a fecal occult blood test, a fecal DNA test, and sigmoidoscopy.  -All adults born between [facility] should be screened once for Hepatitis C.  -An Abdominal Aortic Aneurysm (AAA) Screening is recommended for men age 73-68 who has ever smoked in their lifetime.      Here is a list of your current Health Maintenance items (your personalized list of preventive services) with a due date:  Health Maintenance Due   Topic Date Due    Hepatitis C Test  Never done    COVID-19 Vaccine (3 - Booster for Martin Gallus series) 07/24/2021

## 2022-06-09 NOTE — PROGRESS NOTES
HISTORY OF PRESENT ILLNESS  Robinson Cason is a 65 y.o. male. HPI  Assessment:  Olivia is seen today for Medicare wellness visit and follow up of chronic problems. 1. Preventive care. See attached wellness visit note. He is due for labs and his fourth COVID shot. He is up to date with urology follow up and other vaccinations. 2. Chronic problems are reviewed. Blood pressure is fine. Cholesterol is due. Social history notable for him retiring. Congratulations were offered. I informed him of my departure from the practice on 9-1-22. The group will send a letter detailing new physician availabilities . I wished him and his family the best.    Review of Systems   Constitutional: Negative for chills, fever and weight loss. Respiratory: Negative. Cardiovascular: Negative for chest pain, palpitations, leg swelling and PND. Gastrointestinal: Negative. Genitourinary: Positive for frequency. Musculoskeletal: Negative for myalgias. Skin: Positive for rash. Lesion removed, ? Check wound   Neurological: Negative for focal weakness. Physical Exam  Vitals and nursing note reviewed. Constitutional:       General: He is not in acute distress. Appearance: He is well-developed. HENT:      Head: Normocephalic and atraumatic. Right Ear: Tympanic membrane, ear canal and external ear normal.      Left Ear: Tympanic membrane, ear canal and external ear normal.   Eyes:      General:         Right eye: No discharge. Left eye: No discharge. Pupils: Pupils are equal, round, and reactive to light. Neck:      Thyroid: No thyromegaly. Vascular: No carotid bruit. Cardiovascular:      Rate and Rhythm: Normal rate and regular rhythm. Heart sounds: Normal heart sounds. No murmur heard. No friction rub. No gallop. Pulmonary:      Effort: Pulmonary effort is normal. No respiratory distress. Breath sounds: Normal breath sounds. No wheezing or rales. Abdominal:      General: Bowel sounds are normal. There is no distension. Palpations: Abdomen is soft. There is no mass. Tenderness: There is no abdominal tenderness. There is no guarding or rebound. Musculoskeletal:         General: No tenderness. Normal range of motion. Cervical back: Normal range of motion and neck supple. Lymphadenopathy:      Cervical: No cervical adenopathy. Skin:     General: Skin is warm and dry. Findings: No rash. Neurological:      Mental Status: He is alert and oriented to person, place, and time. Deep Tendon Reflexes: Reflexes are normal and symmetric. Psychiatric:         Behavior: Behavior normal.         ASSESSMENT and PLAN  Diagnoses and all orders for this visit:    1. Medicare annual wellness visit, subsequent    2. Essential hypertension, benign  -     METABOLIC PANEL, BASIC; Future  -     URINALYSIS W/ RFLX MICROSCOPIC; Future    3. Mixed hyperlipidemia  -     LIPID PANEL; Future  -     HEPATIC FUNCTION PANEL; Future  -     TSH 3RD GENERATION; Future    4. Encounter for long-term (current) use of medications  -     CBC WITH AUTOMATED DIFF; Future    5. Screening for prostate cancer    6. Encounter for hepatitis C screening test for low risk patient  -     HEPATITIS C AB; Future    7.  Malignant neoplasm of prostate ([facility] [address]

## 2022-06-10 LAB
ALBUMIN SERPL-MCNC: 4.1 G/DL (ref 3.5–5)
ALBUMIN/GLOB SERPL: 1.2 {RATIO} (ref 1.1–2.2)
ALP SERPL-CCNC: 66 U/L (ref 45–117)
ALT SERPL-CCNC: 31 U/L (ref 12–78)
ANION GAP SERPL CALC-SCNC: 6 MMOL/L (ref 5–15)
APPEARANCE UR: ABNORMAL
AST SERPL-CCNC: 28 U/L (ref 15–37)
BACTERIA URNS QL MICRO: NEGATIVE /HPF
BASOPHILS # BLD: 0.1 K/UL (ref 0–0.1)
BASOPHILS NFR BLD: 1 % (ref 0–1)
BILIRUB DIRECT SERPL-MCNC: 0.1 MG/DL (ref 0–0.2)
BILIRUB SERPL-MCNC: 0.6 MG/DL (ref 0.2–1)
BILIRUB UR QL: NEGATIVE
BUN SERPL-MCNC: 21 MG/DL (ref 6–20)
BUN/CREAT SERPL: 19 (ref 12–20)
CALCIUM SERPL-MCNC: 9.3 MG/DL (ref 8.5–10.1)
CHLORIDE SERPL-SCNC: 109 MMOL/L (ref 97–108)
CHOLEST SERPL-MCNC: 153 MG/DL
CO2 SERPL-SCNC: 27 MMOL/L (ref 21–32)
COLOR UR: ABNORMAL
CREAT SERPL-MCNC: 1.13 MG/DL (ref 0.7–1.3)
DIFFERENTIAL METHOD BLD: NORMAL
EOSINOPHIL # BLD: 0.1 K/UL (ref 0–0.4)
EOSINOPHIL NFR BLD: 2 % (ref 0–7)
EPITH CASTS URNS QL MICRO: ABNORMAL /LPF
ERYTHROCYTE [DISTWIDTH] IN BLOOD BY AUTOMATED COUNT: 13.8 % (ref 11.5–14.5)
GLOBULIN SER CALC-MCNC: 3.3 G/DL (ref 2–4)
GLUCOSE SERPL-MCNC: 85 MG/DL (ref 65–100)
GLUCOSE UR STRIP.AUTO-MCNC: NEGATIVE MG/DL
HCT VFR BLD AUTO: 48.6 % (ref 36.6–50.3)
HCV AB SERPL QL IA: NONREACTIVE
HDLC SERPL-MCNC: 41 MG/DL
HDLC SERPL: 3.7 {RATIO} (ref 0–5)
HGB BLD-MCNC: 15.5 G/DL (ref 12.1–17)
HGB UR QL STRIP: NEGATIVE
HYALINE CASTS URNS QL MICRO: ABNORMAL /LPF (ref 0–5)
IMM GRANULOCYTES # BLD AUTO: 0 K/UL (ref 0–0.04)
IMM GRANULOCYTES NFR BLD AUTO: 0 % (ref 0–0.5)
KETONES UR QL STRIP.AUTO: NEGATIVE MG/DL
LDLC SERPL CALC-MCNC: 81 MG/DL (ref 0–100)
LEUKOCYTE ESTERASE UR QL STRIP.AUTO: NEGATIVE
LYMPHOCYTES # BLD: 1.7 K/UL (ref 0.8–3.5)
LYMPHOCYTES NFR BLD: 26 % (ref 12–49)
MCH RBC QN AUTO: 29 PG (ref 26–34)
MCHC RBC AUTO-ENTMCNC: 31.9 G/DL (ref 30–36.5)
MCV RBC AUTO: 90.8 FL (ref 80–99)
MONOCYTES # BLD: 0.5 K/UL (ref 0–1)
MONOCYTES NFR BLD: 8 % (ref 5–13)
NEUTS SEG # BLD: 4.2 K/UL (ref 1.8–8)
NEUTS SEG NFR BLD: 63 % (ref 32–75)
NITRITE UR QL STRIP.AUTO: NEGATIVE
NRBC # BLD: 0 K/UL (ref 0–0.01)
NRBC BLD-RTO: 0 PER 100 WBC
PH UR STRIP: 5 [PH] (ref 5–8)
PLATELET # BLD AUTO: 234 K/UL (ref 150–400)
PMV BLD AUTO: 9.6 FL (ref 8.9–12.9)
POTASSIUM SERPL-SCNC: 4 MMOL/L (ref 3.5–5.1)
PROT SERPL-MCNC: 7.4 G/DL (ref 6.4–8.2)
PROT UR STRIP-MCNC: NEGATIVE MG/DL
RBC # BLD AUTO: 5.35 M/UL (ref 4.1–5.7)
RBC #/AREA URNS HPF: ABNORMAL /HPF (ref 0–5)
SODIUM SERPL-SCNC: 142 MMOL/L (ref 136–145)
SP GR UR REFRACTOMETRY: 1.03 (ref 1–1.03)
TRIGL SERPL-MCNC: 155 MG/DL (ref ?–150)
TSH SERPL DL<=0.05 MIU/L-ACNC: 0.9 UIU/ML (ref 0.36–3.74)
UROBILINOGEN UR QL STRIP.AUTO: 0.2 EU/DL (ref 0.2–1)
VLDLC SERPL CALC-MCNC: 31 MG/DL
WBC # BLD AUTO: 6.6 K/UL (ref 4.1–11.1)
WBC URNS QL MICRO: ABNORMAL /HPF (ref 0–4)

## 2022-09-23 ENCOUNTER — PATIENT MESSAGE (OUTPATIENT)
Dept: INTERNAL MEDICINE CLINIC | Age: 77
End: 2022-09-23

## 2023-07-10 NOTE — TELEPHONE ENCOUNTER
Medication Refill Request    Mary Chairez is requesting a refill of the following medication(s):   Lisinopril/NHND98-96.5mg Tab Qty:90  Please send refill to:   [address], [address] [place] [address] [phone]

## 2023-07-12 ENCOUNTER — TELEPHONE (OUTPATIENT)
Age: 78
End: 2023-07-12

## 2023-07-12 NOTE — TELEPHONE ENCOUNTER
Medication Refill Request    Vidhya Horn is requesting a refill of the following medication(s):   Lisinopril/HCTZ 20-12.5mg  Please send refill to:    [facility], [address] [place] [address] [phone]

## (undated) DEVICE — AIRLIFE™ U/CONNECT-IT OXYGEN TUBING 7 FEET (2.1 M) CRUSH-RESISTANT OXYGEN TUBING, VINYL TIPPED: Brand: AIRLIFE™

## (undated) DEVICE — Z DISCONTINUED USE 2751540 TUBING IRRIG L10IN DISP PMP ENDOGATOR

## (undated) DEVICE — NEEDLE HYPO 18GA L1.5IN PNK S STL HUB POLYPR SHLD REG BVL

## (undated) DEVICE — 1200 GUARD II KIT W/5MM TUBE W/O VAC TUBE: Brand: GUARDIAN

## (undated) DEVICE — CATH IV AUTOGRD BC BLU 22GA 25 -- INSYTE

## (undated) DEVICE — Z DISCONTINUED NO SUB IDED SET EXTN W/ 4 W STPCOCK M SPIN LOK 36IN

## (undated) DEVICE — BAG BELONG PT PERS CLEAR HANDL

## (undated) DEVICE — SOLIDIFIER FLUID 3000 CC ABSORB

## (undated) DEVICE — KENDALL RADIOLUCENT FOAM MONITORING ELECTRODE -RECTANGULAR SHAPE: Brand: KENDALL

## (undated) DEVICE — WRISTBAND ID AD W1XL11.5IN RED POLY ALRG PREPRINTED PERM

## (undated) DEVICE — ENDO CARRY-ON PROCEDURE KIT INCLUDES ENZYMATIC SPONGE, GAUZE, BIOHAZARD LABEL, TRAY, LUBRICANT, DIRTY SCOPE LABEL, WATER LABEL, TRAY, DRAWSTRING PAD, AND DEFENDO 4-PIECE KIT.: Brand: ENDO CARRY-ON PROCEDURE KIT

## (undated) DEVICE — QUILTED PREMIUM COMFORT UNDERPAD,EXTRA HEAVY: Brand: WINGS

## (undated) DEVICE — Device

## (undated) DEVICE — NEONATAL-ADULT SPO2 SENSOR: Brand: NELLCOR

## (undated) DEVICE — BW-412T DISP COMBO CLEANING BRUSH: Brand: SINGLE USE COMBINATION CLEANING BRUSH

## (undated) DEVICE — SYRINGE MED 20ML STD CLR PLAS LUERLOCK TIP N CTRL DISP

## (undated) DEVICE — CONNECTOR TBNG AUX H2O JET DISP FOR OLY 160/180 SER

## (undated) DEVICE — SET ADMIN 16ML TBNG L100IN 2 Y INJ SITE IV PIGGY BK DISP

## (undated) DEVICE — CANN NASAL O2 CAPNOGRAPHY AD -- FILTERLINE

## (undated) DEVICE — KIT IV STRT W CHLORAPREP PD 1ML